# Patient Record
Sex: FEMALE | Race: WHITE | NOT HISPANIC OR LATINO | Employment: FULL TIME | URBAN - METROPOLITAN AREA
[De-identification: names, ages, dates, MRNs, and addresses within clinical notes are randomized per-mention and may not be internally consistent; named-entity substitution may affect disease eponyms.]

---

## 2017-12-24 ENCOUNTER — HOSPITAL ENCOUNTER (EMERGENCY)
Facility: MEDICAL CENTER | Age: 42
End: 2017-12-24
Attending: EMERGENCY MEDICINE
Payer: COMMERCIAL

## 2017-12-24 VITALS
TEMPERATURE: 97.8 F | HEART RATE: 66 BPM | DIASTOLIC BLOOD PRESSURE: 85 MMHG | SYSTOLIC BLOOD PRESSURE: 132 MMHG | RESPIRATION RATE: 20 BRPM | BODY MASS INDEX: 25.59 KG/M2 | WEIGHT: 149.91 LBS | OXYGEN SATURATION: 99 % | HEIGHT: 64 IN

## 2017-12-24 DIAGNOSIS — J02.9 PHARYNGITIS, UNSPECIFIED ETIOLOGY: ICD-10-CM

## 2017-12-24 LAB
S PYO AG THROAT QL: NORMAL
SIGNIFICANT IND 70042: NORMAL
SITE SITE: NORMAL
SOURCE SOURCE: NORMAL

## 2017-12-24 PROCEDURE — 87880 STREP A ASSAY W/OPTIC: CPT

## 2017-12-24 PROCEDURE — 87081 CULTURE SCREEN ONLY: CPT

## 2017-12-24 PROCEDURE — 99283 EMERGENCY DEPT VISIT LOW MDM: CPT

## 2017-12-24 RX ORDER — PENICILLIN V POTASSIUM 500 MG/1
500 TABLET ORAL EVERY 6 HOURS
Qty: 40 TAB | Refills: 0 | Status: SHIPPED | OUTPATIENT
Start: 2017-12-24 | End: 2018-01-03

## 2017-12-24 ASSESSMENT — PAIN SCALES - GENERAL: PAINLEVEL_OUTOF10: 3

## 2017-12-24 NOTE — DISCHARGE INSTRUCTIONS
Pharyngitis  Pharyngitis is a sore throat (pharynx). There is redness, pain, and swelling of your throat.  HOME CARE   · Drink enough fluids to keep your pee (urine) clear or pale yellow.  · Only take medicine as told by your doctor.  ¨ You may get sick again if you do not take medicine as told. Finish your medicines, even if you start to feel better.  ¨ Do not take aspirin.  · Rest.  · Rinse your mouth (gargle) with salt water (½ tsp of salt per 1 qt of water) every 1-2 hours. This will help the pain.  · If you are not at risk for choking, you can suck on hard candy or sore throat lozenges.  GET HELP IF:  · You have large, tender lumps on your neck.  · You have a rash.  · You cough up green, yellow-brown, or bloody spit.  GET HELP RIGHT AWAY IF:   · You have a stiff neck.  · You drool or cannot swallow liquids.  · You throw up (vomit) or are not able to keep medicine or liquids down.  · You have very bad pain that does not go away with medicine.  · You have problems breathing (not from a stuffy nose).  MAKE SURE YOU:   · Understand these instructions.  · Will watch your condition.  · Will get help right away if you are not doing well or get worse.     This information is not intended to replace advice given to you by your health care provider. Make sure you discuss any questions you have with your health care provider.     Document Released: 06/05/2009 Document Revised: 10/08/2014 Document Reviewed: 08/25/2014  [employer] Interactive Patient Education ©2016 [employer]    Salt Water Gargle  This solution will help make your mouth and throat feel better.  HOME CARE INSTRUCTIONS   · Mix 1 teaspoon of salt in 8 ounces of warm water.  · Gargle with this solution as much or often as you need or as directed. Swish and gargle gently if you have any sores or wounds in your mouth.  · Do not swallow this mixture.     This information is not intended to replace advice given to you by your health care provider. Make sure you  discuss any questions you have with your health care provider.     Document Released: 09/21/2005 Document Revised: 03/11/2013 Document Reviewed: 02/12/2010  [employer]vier Interactive Patient Education ©2016 [employer]

## 2017-12-24 NOTE — ED PROVIDER NOTES
"ED Provider Note    CHIEF COMPLAINT  Chief Complaint   Patient presents with   • Sore Throat       HPI  Aixa Yuen is a 42 y.o. female who presents For evaluation of a sore throat.  The patient presents with one day progressive sore throat mostly on the right side.  The patient denies: Nasal congestion, clear rhinorrhea, cough, sputum, gastrointestinal symptoms, rashes.  No other acute symptomatology or complaints.    REVIEW OF SYSTEMS  See HPI for further details.  She denies any history of: Hypertension, diabetes, thyroid dysfunction, seizures, heart disease, cancer, stroke;    PAST MEDICAL HISTORY  Past Medical History:   Diagnosis Date   • Alcohol use    • Chronic back pain    • Left knee pain        FAMILY HISTORY  Family History   Problem Relation Age of Onset   • Stroke Father    • Alcohol/Drug Father      alcoholic   • Stroke Paternal Grandmother    • Alcohol/Drug Mother      alcoholic   • Hypertension Mother    • Cancer Paternal Aunt      melanoma   • Cancer Paternal Aunt      lung ca, smoker   • Thyroid Paternal Aunt    • Allergies Neg Hx    • Heart Disease Neg Hx    • Diabetes Neg Hx    • Hyperlipidemia Neg Hx    • Psychiatry Neg Hx        SOCIAL HISTORY  Nonsmoker; denies alcohol or drugs;    SURGICAL HISTORY  Past Surgical History:   Procedure Laterality Date   • APPENDECTOMY LAPAROSCOPIC  5/18/2015    Procedure: APPENDECTOMY LAPAROSCOPIC;  Surgeon: Yared Braswell M.D.;  Location: SURGERY [facility];  Service:    • ARTHROSCOPY, KNEE  2013       CURRENT MEDICATIONS  See nurses notes    ALLERGIES  Allergies   Allergen Reactions   • Aleve [Naproxen Sodium] Diarrhea   • Lactose Nausea   • Percocet [Oxycodone-Acetaminophen]    • Sudafed [Pseudoephedrine Hcl]      Bad headache/ wired         PHYSICAL EXAM  VITAL SIGNS: /85   Pulse 66   Temp 36.6 °C (97.8 °F)   Resp 20   Ht 1.626 m (5' 4\") Comment: Stated  Wt 68 kg (149 lb 14.6 oz)   [facility] 12/24/2017   SpO2 99%   BMI 25.73 kg/m²  "   Constitutional: Well developed, Well nourished, No acute distress, Non-toxic appearance.   HENT: Normocephalic, Atraumatic, Nares:Clear, Oropharynx: moist, well hydrated, posterior pharynx:Bilateral erythema to the posterior pharynx with some increased erythema on the right compared to left and no significant exudate and no unilateral throat swelling or deviation of the uvula; phonation is normal;  Eyes: PERRL, EOMI, Conjunctiva normal, No discharge.   Neck: Normal range of motion, No tenderness, Supple, No stridor.   Lymphatic: Tender submandibular lymphadenopathy noted.   Cardiovascular: Regular rate and rhythm without mumurs, gallups, rubs   Thorax & Lungs: Normal Equal breath sounds, No respiratory distress, No wheezing, no stridor, no rales. No chest tenderness.   Abdomen: Soft, nontender, nondistended, no organomegaly, positive bowel sounds normal in quality. No guarding or rebound.  Skin: Good skin turgor, pink, warm, dry. No rashes, petechiae, purpura. Normal capillary refill.   Extremities: Intact distal pulses, No edema, No tenderness, No cyanosis,  Vascular: Pulses are 2+, symmetric in the upper and lower extremities.  Neurologic: Alert & oriented x 3,  No gross focal deficits noted.     COURSE & MEDICAL DECISION MAKING  Pertinent Labs & Imaging studies reviewed. (See chart for details)  Rapid strep is negative;    Discussion: At this time, the patient presents for evaluation of a sore throat.  The patient's rapid strep is negative.  Patient has some clinical findings that I would recommend initiating antibiotics pending culture.  I discussed the findings and treatment plan with the patient.  She indicates that she is comfortable with this exploration and disposition.    FINAL IMPRESSION  1. Pharyngitis, unspecified etiology        PLAN  1.  Appropriate discharge instructions given  2.  Pen-Vee K 500 mg for the  3.  Follow up with primary care  4.  Recheck of change worsening symptoms or any  disconcerting symptoms she is not experiencing at this time.    Electronically signed by: Guy G Gansert, 12/24/2017 11:55 AM

## 2017-12-26 LAB
S PYO SPEC QL CULT: NORMAL
SIGNIFICANT IND 70042: NORMAL
SITE SITE: NORMAL
SOURCE SOURCE: NORMAL

## 2018-01-01 ENCOUNTER — OFFICE VISIT (OUTPATIENT)
Dept: URGENT CARE | Facility: CLINIC | Age: 43
End: 2018-01-01

## 2018-01-01 VITALS
BODY MASS INDEX: 25.61 KG/M2 | DIASTOLIC BLOOD PRESSURE: 78 MMHG | OXYGEN SATURATION: 98 % | HEIGHT: 64 IN | SYSTOLIC BLOOD PRESSURE: 120 MMHG | HEART RATE: 72 BPM | WEIGHT: 150 LBS | RESPIRATION RATE: 20 BRPM | TEMPERATURE: 98.1 F

## 2018-01-01 DIAGNOSIS — J01.00 ACUTE MAXILLARY SINUSITIS, RECURRENCE NOT SPECIFIED: ICD-10-CM

## 2018-01-01 PROCEDURE — 99214 OFFICE O/P EST MOD 30 MIN: CPT | Performed by: NURSE PRACTITIONER

## 2018-01-01 RX ORDER — AMOXICILLIN AND CLAVULANATE POTASSIUM 875; 125 MG/1; MG/1
1 TABLET, FILM COATED ORAL 2 TIMES DAILY
Qty: 14 TAB | Refills: 0 | Status: SHIPPED | OUTPATIENT
Start: 2018-01-01 | End: 2018-01-08

## 2018-01-01 ASSESSMENT — ENCOUNTER SYMPTOMS
CARDIOVASCULAR NEGATIVE: 1
CHILLS: 0
SORE THROAT: 1
EYES NEGATIVE: 1
COUGH: 1
SINUS PAIN: 1
FEVER: 0
SINUS PRESSURE: 1
MYALGIAS: 1

## 2018-01-01 NOTE — PROGRESS NOTES
Subjective:      Aixa Yuen is a 42 y.o. female who presents with Sinus Problem (X 5 days sinus pressure, stuffy nose  , yesterday headache .)    Past Medical History:   Diagnosis Date   • Alcohol use    • Chronic back pain    • Left knee pain      Social History     Social History   • Marital status: Single     Spouse name: N/A   • Number of children: N/A   • Years of education: N/A     Occupational History   • Not on file.     Social History Main Topics   • Smoking status: Never Smoker   • Smokeless tobacco: Never Used   • Alcohol use No   • Drug use: No   • Sexual activity: Not on file     Other Topics Concern   • Not on file     Social History Narrative    Lives by herself, single.     Work: [employer].      Family History   Problem Relation Age of Onset   • Stroke Father    • Alcohol/Drug Father      alcoholic   • Stroke Paternal Grandmother    • Alcohol/Drug Mother      alcoholic   • Hypertension Mother    • Cancer Paternal Aunt      melanoma   • Cancer Paternal Aunt      lung ca, smoker   • Thyroid Paternal Aunt    • Allergies Neg Hx    • Heart Disease Neg Hx    • Diabetes Neg Hx    • Hyperlipidemia Neg Hx    • Psychiatry Neg Hx        Allergies: Aleve [naproxen sodium]; Lactose; Percocet [oxycodone-acetaminophen]; and Sudafed [pseudoephedrine hcl]          Sinus Problem   This is a new problem. The current episode started in the past 7 days. The problem has been gradually worsening since onset. There has been no fever. Associated symptoms include congestion, coughing, sinus pressure and a sore throat. Pertinent negatives include no chills. Past treatments include nothing. The treatment provided no relief.       Review of Systems   Constitutional: Positive for malaise/fatigue. Negative for chills and fever.   HENT: Positive for congestion, sinus pain, sinus pressure and sore throat.    Eyes: Negative.    Respiratory: Positive for cough.    Cardiovascular: Negative.    Musculoskeletal: Positive for  "myalgias.   Skin: Negative.    All other systems reviewed and are negative.         Objective:     /78   Pulse 72   Temp 36.7 °C (98.1 °F)   Resp 20   Ht 1.626 m (5' 4\")   Wt 68 kg (150 lb)   LMP 12/24/2017   SpO2 98%   BMI 25.75 kg/m²      Physical Exam   Constitutional: She is oriented to person, place, and time. She appears well-developed and well-nourished.   HENT:   Head: Normocephalic.   Right Ear: External ear normal.   Left Ear: External ear normal.   Nose: Nose normal.   Mouth/Throat: Oropharynx is clear and moist. No oropharyngeal exudate.   Eyes: Pupils are equal, round, and reactive to light.   Neck: Normal range of motion. Neck supple.   Cardiovascular: Normal rate.    Pulmonary/Chest: Effort normal and breath sounds normal.   Musculoskeletal: Normal range of motion.   Neurological: She is alert and oriented to person, place, and time.   Skin: Skin is warm and dry. Capillary refill takes less than 2 seconds.   Psychiatric: She has a normal mood and affect. Her behavior is normal. Judgment and thought content normal.   Vitals reviewed.              Assessment/Plan:     1. Acute maxillary sinusitis, recurrence not specified  -Augmentin  -flonase  -humidifier  -saltwater gargles PRN  -follow up with PCP or ENT for persistent or worsening of symptoms.       "

## 2018-07-16 ENCOUNTER — HOSPITAL ENCOUNTER (OUTPATIENT)
Dept: RADIOLOGY | Facility: MEDICAL CENTER | Age: 43
End: 2018-07-16

## 2018-07-16 ENCOUNTER — APPOINTMENT (OUTPATIENT)
Dept: RADIOLOGY | Facility: MEDICAL CENTER | Age: 43
End: 2018-07-16
Attending: OBSTETRICS & GYNECOLOGY
Payer: COMMERCIAL

## 2018-07-19 ENCOUNTER — HOSPITAL ENCOUNTER (OUTPATIENT)
Dept: RADIOLOGY | Facility: MEDICAL CENTER | Age: 43
End: 2018-07-19
Attending: OBSTETRICS & GYNECOLOGY
Payer: COMMERCIAL

## 2018-07-19 DIAGNOSIS — Z86.018 STATUS POST EXCISION OF FIBROADENOMA OF BREAST: ICD-10-CM

## 2018-07-19 DIAGNOSIS — Z86.018 HISTORY OF BENIGN NEOPLASM: ICD-10-CM

## 2018-07-19 DIAGNOSIS — Z98.890 STATUS POST EXCISION OF FIBROADENOMA OF BREAST: ICD-10-CM

## 2018-07-19 PROCEDURE — G0279 TOMOSYNTHESIS, MAMMO: HCPCS

## 2018-07-19 PROCEDURE — 76642 ULTRASOUND BREAST LIMITED: CPT | Mod: LT

## 2021-10-04 ENCOUNTER — APPOINTMENT (OUTPATIENT)
Dept: RADIOLOGY | Facility: MEDICAL CENTER | Age: 46
End: 2021-10-04
Attending: EMERGENCY MEDICINE
Payer: COMMERCIAL

## 2021-10-04 ENCOUNTER — HOSPITAL ENCOUNTER (EMERGENCY)
Facility: MEDICAL CENTER | Age: 46
End: 2021-10-04
Attending: EMERGENCY MEDICINE
Payer: COMMERCIAL

## 2021-10-04 VITALS
TEMPERATURE: 97.4 F | HEART RATE: 46 BPM | DIASTOLIC BLOOD PRESSURE: 82 MMHG | HEIGHT: 64 IN | WEIGHT: 139.77 LBS | SYSTOLIC BLOOD PRESSURE: 158 MMHG | OXYGEN SATURATION: 98 % | BODY MASS INDEX: 23.86 KG/M2 | RESPIRATION RATE: 14 BRPM

## 2021-10-04 DIAGNOSIS — M54.9 UPPER BACK PAIN ON RIGHT SIDE: ICD-10-CM

## 2021-10-04 LAB
ALBUMIN SERPL BCP-MCNC: 4.4 G/DL (ref 3.2–4.9)
ALBUMIN/GLOB SERPL: 1.8 G/DL
ALP SERPL-CCNC: 36 U/L (ref 30–99)
ALT SERPL-CCNC: 13 U/L (ref 2–50)
ANION GAP SERPL CALC-SCNC: 12 MMOL/L (ref 7–16)
APPEARANCE UR: ABNORMAL
AST SERPL-CCNC: 15 U/L (ref 12–45)
BACTERIA #/AREA URNS HPF: ABNORMAL /HPF
BASOPHILS # BLD AUTO: 1.2 % (ref 0–1.8)
BASOPHILS # BLD: 0.07 K/UL (ref 0–0.12)
BILIRUB SERPL-MCNC: 0.5 MG/DL (ref 0.1–1.5)
BILIRUB UR QL STRIP.AUTO: NEGATIVE
BUN SERPL-MCNC: 13 MG/DL (ref 8–22)
CALCIUM SERPL-MCNC: 8.9 MG/DL (ref 8.4–10.2)
CHLORIDE SERPL-SCNC: 107 MMOL/L (ref 96–112)
CO2 SERPL-SCNC: 23 MMOL/L (ref 20–33)
COLOR UR: YELLOW
CREAT SERPL-MCNC: 0.73 MG/DL (ref 0.5–1.4)
EKG IMPRESSION: NORMAL
EOSINOPHIL # BLD AUTO: 0.16 K/UL (ref 0–0.51)
EOSINOPHIL NFR BLD: 2.7 % (ref 0–6.9)
EPI CELLS #/AREA URNS HPF: ABNORMAL /HPF
ERYTHROCYTE [DISTWIDTH] IN BLOOD BY AUTOMATED COUNT: 44.4 FL (ref 35.9–50)
GLOBULIN SER CALC-MCNC: 2.5 G/DL (ref 1.9–3.5)
GLUCOSE SERPL-MCNC: 95 MG/DL (ref 65–99)
GLUCOSE UR STRIP.AUTO-MCNC: NEGATIVE MG/DL
HCT VFR BLD AUTO: 40.4 % (ref 37–47)
HGB BLD-MCNC: 13.5 G/DL (ref 12–16)
IMM GRANULOCYTES # BLD AUTO: 0.01 K/UL (ref 0–0.11)
IMM GRANULOCYTES NFR BLD AUTO: 0.2 % (ref 0–0.9)
KETONES UR STRIP.AUTO-MCNC: 15 MG/DL
LEUKOCYTE ESTERASE UR QL STRIP.AUTO: NEGATIVE
LIPASE SERPL-CCNC: 18 U/L (ref 7–58)
LYMPHOCYTES # BLD AUTO: 1.84 K/UL (ref 1–4.8)
LYMPHOCYTES NFR BLD: 30.7 % (ref 22–41)
MCH RBC QN AUTO: 31 PG (ref 27–33)
MCHC RBC AUTO-ENTMCNC: 33.4 G/DL (ref 33.6–35)
MCV RBC AUTO: 92.7 FL (ref 81.4–97.8)
MICRO URNS: ABNORMAL
MONOCYTES # BLD AUTO: 0.62 K/UL (ref 0–0.85)
MONOCYTES NFR BLD AUTO: 10.3 % (ref 0–13.4)
MUCOUS THREADS #/AREA URNS HPF: ABNORMAL /HPF
NEUTROPHILS # BLD AUTO: 3.3 K/UL (ref 2–7.15)
NEUTROPHILS NFR BLD: 54.9 % (ref 44–72)
NITRITE UR QL STRIP.AUTO: NEGATIVE
NRBC # BLD AUTO: 0 K/UL
NRBC BLD-RTO: 0 /100 WBC
PH UR STRIP.AUTO: 5.5 [PH] (ref 5–8)
PLATELET # BLD AUTO: 219 K/UL (ref 164–446)
PMV BLD AUTO: 10.5 FL (ref 9–12.9)
POTASSIUM SERPL-SCNC: 3.5 MMOL/L (ref 3.6–5.5)
PROT SERPL-MCNC: 6.9 G/DL (ref 6–8.2)
PROT UR QL STRIP: NEGATIVE MG/DL
RBC # BLD AUTO: 4.36 M/UL (ref 4.2–5.4)
RBC # URNS HPF: ABNORMAL /HPF
RBC UR QL AUTO: ABNORMAL
SODIUM SERPL-SCNC: 142 MMOL/L (ref 135–145)
SP GR UR STRIP.AUTO: 1.02
TROPONIN T SERPL-MCNC: <6 NG/L (ref 6–19)
WBC # BLD AUTO: 6 K/UL (ref 4.8–10.8)
WBC #/AREA URNS HPF: ABNORMAL /HPF

## 2021-10-04 PROCEDURE — 99283 EMERGENCY DEPT VISIT LOW MDM: CPT

## 2021-10-04 PROCEDURE — 700102 HCHG RX REV CODE 250 W/ 637 OVERRIDE(OP): Performed by: EMERGENCY MEDICINE

## 2021-10-04 PROCEDURE — 81001 URINALYSIS AUTO W/SCOPE: CPT

## 2021-10-04 PROCEDURE — 80053 COMPREHEN METABOLIC PANEL: CPT

## 2021-10-04 PROCEDURE — 85025 COMPLETE CBC W/AUTO DIFF WBC: CPT

## 2021-10-04 PROCEDURE — 71045 X-RAY EXAM CHEST 1 VIEW: CPT

## 2021-10-04 PROCEDURE — 93005 ELECTROCARDIOGRAM TRACING: CPT | Performed by: EMERGENCY MEDICINE

## 2021-10-04 PROCEDURE — A9270 NON-COVERED ITEM OR SERVICE: HCPCS | Performed by: EMERGENCY MEDICINE

## 2021-10-04 PROCEDURE — 83690 ASSAY OF LIPASE: CPT

## 2021-10-04 PROCEDURE — 84484 ASSAY OF TROPONIN QUANT: CPT

## 2021-10-04 RX ORDER — CYCLOBENZAPRINE HCL 10 MG
10 TABLET ORAL 3 TIMES DAILY PRN
Qty: 15 TABLET | Refills: 0 | Status: SHIPPED | OUTPATIENT
Start: 2021-10-04 | End: 2022-06-29

## 2021-10-04 RX ORDER — CYCLOBENZAPRINE HCL 10 MG
10 TABLET ORAL ONCE
Status: COMPLETED | OUTPATIENT
Start: 2021-10-04 | End: 2021-10-04

## 2021-10-04 RX ADMIN — CYCLOBENZAPRINE 10 MG: 10 TABLET, FILM COATED ORAL at 06:18

## 2021-10-04 NOTE — ED PROVIDER NOTES
ED Provider Note    CHIEF COMPLAINT  Chief Complaint   Patient presents with   • Flank Pain     right side pain that starts in her back and feels like it goes straight through the fron of her abd    • Nausea       HPI  Aixa Yuen is a 45 y.o. female who presents with right-sided chest/right upper quadrant pain that radiates into her back in the area of her right scapula.  She notes that the pain has been present for the past 2 days.  She was seen at a hospital in [place] yesterday for this.  We do have access to their medical records.  She states that she was visiting her sister at that time.  Nausea without vomiting.  Denies prior history of similar symptoms in the past.  She states that after eating the pain got slightly worse.  No fevers.  No cough or recent illness.  Has a prior history of appendectomy though no other abdominal surgeries.    No lower extremity swelling or pain.  No prior history of DVT or PE.  No known cardiovascular disease history.  No dysuria or hematuria.  No vaginal bleeding or discharge.    I did review the medical records from the outside hospital from yesterday.  She was evaluated for right upper quadrant and right upper back pain at that time.  She had an unremarkable abdominal ultrasound.  Laboratory studies were unremarkable as well.    REVIEW OF SYSTEMS  See HPI for further details. All other systems are negative.     PAST MEDICAL HISTORY   has a past medical history of Alcohol use, Chronic back pain, and Left knee pain.    SOCIAL HISTORY  Social History     Tobacco Use   • Smoking status: Never Smoker   • Smokeless tobacco: Never Used   Vaping Use   • Vaping Use: Never used   Substance and Sexual Activity   • Alcohol use: Yes     Alcohol/week: 6.0 oz     Types: 10 Glasses of wine per week     Comment: 2 drinks a day, fove days a weel    • Drug use: No   • Sexual activity: Not on file       SURGICAL HISTORY   has a past surgical history that includes arthroscopy, knee (2013)  "and appendectomy laparoscopic (2015).    CURRENT MEDICATIONS  Home Medications    **Home medications have not yet been reviewed for this encounter**         ALLERGIES  Allergies   Allergen Reactions   • Aleve [Naproxen Sodium] Diarrhea   • Lactose Nausea   • Percocet [Oxycodone-Acetaminophen] Itching       PHYSICAL EXAM  VITAL SIGNS: Pulse (!) 54   Temp 36.8 °C (98.2 °F) (Temporal)   Resp 18   Ht 1.626 m (5' 4\")   Wt 63.4 kg (139 lb 12.4 oz)   SpO2 98%   BMI 23.99 kg/m²   Pulse ox interpretation: I interpret this pulse ox as normal.  Constitutional: Alert in no apparent distress.  HENT: No signs of trauma, Bilateral external ears normal, Nose normal.   Eyes: Conjunctiva normal, Non-icteric.   Neck: Normal range of motion, Supple, No stridor.   Cardiovascular: Regular rate and rhythm.   Thorax & Lungs: Normal breath sounds, No respiratory distress, No wheezing, right lower chest tenderness.   Abdomen: Soft, No tenderness, No masses, No pulsatile masses. No peritoneal signs.  Skin: Warm, Dry, No erythema, No rash.   Back: No bony tenderness, No CVA tenderness.  Point tenderness just medial to the right scapula in the paraspinous region.  Extremities: Intact distal pulses, No edema, No cyanosis  Musculoskeletal: Good range of motion in all major joints. No major deformities noted.   Neurologic: Alert, No focal deficits noted.       DIAGNOSTIC STUDIES / PROCEDURES    EKG - Physician interpretation  Results for orders placed or performed during the hospital encounter of 10/04/21   EKG   Result Value Ref Range    Report       [facility] Emergency Dept.    Test Date:  2021-10-04  Pt Name:    SOPHIE HWANG               Department: [facility]  MRN:        [id number]                      Room:       -ROOM 1  Gender:     Female                       Technician: [id number]  :        1975                   Requested By:AKASH CRUZ  Order #:    [id number]                    Reading MD: AKASH " MD ANTHONY    Measurements  Intervals                                Axis  Rate:       45                           P:          35  OK:         139                          QRS:        80  QRSD:       99                           T:          50  QT:         461  QTc:        399    Interpretive Statements  Sinus bradycardia  Low voltage, precordial leads  Compared to ECG 05/18/2015 19:22:35  Electronically Signed On 10-4-2021 4:10:23 PDT by AKASH CRUZ MD           LABS  Labs Reviewed   URINALYSIS - Abnormal; Notable for the following components:       Result Value    Character Hazy (*)     Ketones 15 (*)     Occult Blood Small (*)     All other components within normal limits   CBC WITH DIFFERENTIAL - Abnormal; Notable for the following components:    MCHC 33.4 (*)     All other components within normal limits   COMP METABOLIC PANEL - Abnormal; Notable for the following components:    Potassium 3.5 (*)     All other components within normal limits   URINE MICROSCOPIC (W/UA) - Abnormal; Notable for the following components:    RBC 2-5 (*)     Bacteria Few (*)     All other components within normal limits   LIPASE   TROPONIN   ESTIMATED GFR         RADIOLOGY  DX-CHEST-PORTABLE (1 VIEW)   Final Result         1.  No acute cardiopulmonary disease.            COURSE & MEDICAL DECISION MAKING    Medications   cyclobenzaprine (Flexeril) tablet 10 mg (10 mg Oral Given 10/4/21 0618)       Pertinent Labs & Imaging studies reviewed. (See chart for details)  45 y.o. female presenting with right upper back pain.  She was seen yesterday at another emergency department for similar symptoms and is seeking care for continued pain.  It does appear that the pain is more in the thoracic region rather than the abdomen.  She does appear to have point tenderness just medial to the scapula in the area of the rhomboid muscles.  Normal range of motion of bilateral upper extremities.  No abdominal tenderness.  No dysuria or hematuria.    Given  "the location of the pain in the chest, x-ray was performed and was unremarkable.  EKG and troponin are unremarkable as well.  Heart score of 1 given age.  Pulmonary embolism rule out criteria negative.    She does have point tenderness to the back on physical examination.  She does note on Thursday, prior to the onset of symptoms, she had been moving some large/heavy items.    The patient is hemodynamically stable.  No tachycardia.  Clear breath sounds bilaterally.  No respiratory distress.  Given the reproducible nature of the patient's pain, I do suspect more of a musculoskeletal cause rather than a cardiac or pulmonary cause.  Low suspicion for an aortic injury resulting in right-sided chest and back discomfort.  Equal pulses bilaterally.  No focal neurologic deficit.    Recommending pain management with NSAIDs and to use Flexeril as needed for breakthrough pain symptoms.  Recommending follow-up with primary care physician for further management.    The patient was instructed to follow-up with primary care physician for further management.  To return immediately for any worsening symptoms or development of any other concerning signs or symptoms. The patient verbalizes understanding in their own words.    /82   Pulse (!) 46   Temp 36.3 °C (97.4 °F) (Temporal)   Resp 14   Ht 1.626 m (5' 4\")   Wt 63.4 kg (139 lb 12.4 oz)   LMP 09/27/2021   SpO2 98%   BMI 23.99 kg/m²     The patient was referred to primary care where they will receive further BP management.      [facility], Emergency Dept  [address]  [place] [address]  [phone]    As needed, If symptoms worsen    Primary care doctor    Schedule an appointment as soon as possible for a visit         FINAL IMPRESSION  1. Upper back pain on right side            Electronically signed by: Waqar Flor M.D., 10/4/2021 3:42 AM    "

## 2021-10-04 NOTE — ED NOTES
Patient discharged home in stable condition with   AVS provided with recommended follow up and home care instructions and education information  Prescription for Flexeril electronically provided at this time  Patient and  verbalized understanding  Ambulatory at time of discharge

## 2021-10-04 NOTE — ED TRIAGE NOTES
"Chief Complaint   Patient presents with   • Flank Pain     right side pain that starts in her back and feels like it goes straight through the fron of her abd    • Nausea     Pulse (!) 54   Temp 36.8 °C (98.2 °F) (Temporal)   Resp 18   Ht 1.626 m (5' 4\")   Wt 63.4 kg (139 lb 12.4 oz)   LMP 09/27/2021   SpO2 98%   BMI 23.99 kg/m²     Has this patient been vaccinated for COVID no  If not, would they like to be vaccinated while in the ER if eligible?  no  Would the patient like to speak with the ERP about the possibility of receiving the COVID vaccine today before making a decision? no    "

## 2022-03-07 ENCOUNTER — HOSPITAL ENCOUNTER (OUTPATIENT)
Facility: MEDICAL CENTER | Age: 47
End: 2022-03-07
Attending: NURSE PRACTITIONER
Payer: COMMERCIAL

## 2022-03-07 ENCOUNTER — EH NON-PROVIDER (OUTPATIENT)
Dept: OCCUPATIONAL MEDICINE | Facility: CLINIC | Age: 47
End: 2022-03-07

## 2022-03-07 DIAGNOSIS — Z02.1 PRE-EMPLOYMENT HEALTH SCREENING EXAMINATION: ICD-10-CM

## 2022-03-07 DIAGNOSIS — Z02.1 PRE-EMPLOYMENT HEALTH SCREENING EXAMINATION: Primary | ICD-10-CM

## 2022-03-07 PROCEDURE — 94375 RESPIRATORY FLOW VOLUME LOOP: CPT | Performed by: NURSE PRACTITIONER

## 2022-03-07 PROCEDURE — 86480 TB TEST CELL IMMUN MEASURE: CPT | Performed by: NURSE PRACTITIONER

## 2022-03-10 LAB
GAMMA INTERFERON BACKGROUND BLD IA-ACNC: 0.03 IU/ML
M TB IFN-G BLD-IMP: NEGATIVE
M TB IFN-G CD4+ BCKGRND COR BLD-ACNC: 0 IU/ML
MITOGEN IGNF BCKGRD COR BLD-ACNC: >10 IU/ML
QFT TB2 - NIL TBQ2: -0.01 IU/ML

## 2022-03-14 ENCOUNTER — EMPLOYEE HEALTH (OUTPATIENT)
Dept: OCCUPATIONAL MEDICINE | Facility: CLINIC | Age: 47
End: 2022-03-14

## 2022-03-14 DIAGNOSIS — Z02.1 PRE-EMPLOYMENT HEALTH SCREENING EXAMINATION: ICD-10-CM

## 2022-03-14 PROCEDURE — 8915 PR COMPREHENSIVE PHYSICAL: Performed by: NURSE PRACTITIONER

## 2022-03-21 ENCOUNTER — RESEARCH ENCOUNTER (OUTPATIENT)
Dept: RESEARCH | Facility: WORKSITE | Age: 47
End: 2022-03-21
Payer: COMMERCIAL

## 2022-03-21 DIAGNOSIS — Z00.6 RESEARCH STUDY PATIENT: ICD-10-CM

## 2022-03-21 NOTE — RESEARCH NOTE
[facility] [place] [facility] enrollment complete. Saliva sample collected onsite.[facility] Project enrollment complete. Saliva sample collected onsite.

## 2022-05-02 LAB
APOB+LDLR+PCSK9 GENE MUT ANL BLD/T: NOT DETECTED
BRCA1+BRCA2 DEL+DUP + FULL MUT ANL BLD/T: NOT DETECTED
MLH1+MSH2+MSH6+PMS2 GN DEL+DUP+FUL M: NOT DETECTED

## 2022-05-07 ENCOUNTER — HOSPITAL ENCOUNTER (OUTPATIENT)
Dept: LAB | Facility: MEDICAL CENTER | Age: 47
End: 2022-05-07
Attending: INTERNAL MEDICINE
Payer: COMMERCIAL

## 2022-05-07 LAB
T4 FREE SERPL-MCNC: 1.61 NG/DL (ref 0.93–1.7)
TSH SERPL DL<=0.005 MIU/L-ACNC: 1.78 UIU/ML (ref 0.38–5.33)

## 2022-05-07 PROCEDURE — 84443 ASSAY THYROID STIM HORMONE: CPT

## 2022-05-07 PROCEDURE — 84439 ASSAY OF FREE THYROXINE: CPT

## 2022-05-07 PROCEDURE — 36415 COLL VENOUS BLD VENIPUNCTURE: CPT

## 2022-06-17 PROCEDURE — RXMED WILLOW AMBULATORY MEDICATION CHARGE: Performed by: INTERNAL MEDICINE

## 2022-06-23 ENCOUNTER — PHARMACY VISIT (OUTPATIENT)
Dept: PHARMACY | Facility: MEDICAL CENTER | Age: 47
End: 2022-06-23
Payer: COMMERCIAL

## 2022-06-28 ENCOUNTER — TELEPHONE (OUTPATIENT)
Dept: SCHEDULING | Facility: IMAGING CENTER | Age: 47
End: 2022-06-28

## 2022-06-29 ENCOUNTER — OFFICE VISIT (OUTPATIENT)
Dept: MEDICAL GROUP | Facility: LAB | Age: 47
End: 2022-06-29
Payer: COMMERCIAL

## 2022-06-29 ENCOUNTER — PHARMACY VISIT (OUTPATIENT)
Dept: PHARMACY | Facility: MEDICAL CENTER | Age: 47
End: 2022-06-29
Payer: COMMERCIAL

## 2022-06-29 VITALS
RESPIRATION RATE: 16 BRPM | OXYGEN SATURATION: 97 % | WEIGHT: 131 LBS | DIASTOLIC BLOOD PRESSURE: 62 MMHG | TEMPERATURE: 97.3 F | HEART RATE: 60 BPM | SYSTOLIC BLOOD PRESSURE: 118 MMHG | HEIGHT: 64 IN | BODY MASS INDEX: 22.36 KG/M2

## 2022-06-29 DIAGNOSIS — Z76.89 ENCOUNTER TO ESTABLISH CARE: ICD-10-CM

## 2022-06-29 DIAGNOSIS — K21.00 GASTROESOPHAGEAL REFLUX DISEASE WITH ESOPHAGITIS WITHOUT HEMORRHAGE: ICD-10-CM

## 2022-06-29 DIAGNOSIS — Z13.220 LIPID SCREENING: ICD-10-CM

## 2022-06-29 PROBLEM — M25.551 HIP PAIN, RIGHT: Status: ACTIVE | Noted: 2018-06-04

## 2022-06-29 PROBLEM — R51.9 HEADACHE: Status: ACTIVE | Noted: 2018-11-26

## 2022-06-29 PROBLEM — M25.562 KNEE PAIN, LEFT: Status: ACTIVE | Noted: 2018-06-04

## 2022-06-29 PROBLEM — K21.9 GASTROESOPHAGEAL REFLUX DISEASE: Status: ACTIVE | Noted: 2017-07-03

## 2022-06-29 PROBLEM — R10.11 ABDOMINAL PAIN, RIGHT UPPER QUADRANT: Status: ACTIVE | Noted: 2017-07-03

## 2022-06-29 PROBLEM — J45.909 ASTHMA WITHOUT STATUS ASTHMATICUS: Status: ACTIVE | Noted: 2022-06-29

## 2022-06-29 PROCEDURE — 99386 PREV VISIT NEW AGE 40-64: CPT | Performed by: PHYSICIAN ASSISTANT

## 2022-06-29 PROCEDURE — RXMED WILLOW AMBULATORY MEDICATION CHARGE: Performed by: PHYSICIAN ASSISTANT

## 2022-06-29 RX ORDER — ALBUTEROL SULFATE 90 UG/1
2 AEROSOL, METERED RESPIRATORY (INHALATION)
COMMUNITY

## 2022-06-29 RX ORDER — CIMETIDINE 400 MG/1
400 TABLET, FILM COATED ORAL 2 TIMES DAILY PRN
Qty: 180 TABLET | Refills: 0 | Status: SHIPPED | OUTPATIENT
Start: 2022-06-29 | End: 2022-10-28

## 2022-06-29 RX ORDER — OMEPRAZOLE 40 MG/1
CAPSULE, DELAYED RELEASE ORAL
COMMUNITY
Start: 2022-06-25 | End: 2023-04-25

## 2022-06-29 RX ORDER — LEVOTHYROXINE SODIUM 0.07 MG/1
75 TABLET ORAL DAILY
COMMUNITY
End: 2023-04-25

## 2022-06-29 RX ORDER — SUCRALFATE 1 G/1
1 TABLET ORAL
Qty: 120 TABLET | Refills: 3 | Status: SHIPPED | OUTPATIENT
Start: 2022-06-29 | End: 2023-04-25

## 2022-06-29 ASSESSMENT — FIBROSIS 4 INDEX: FIB4 SCORE: 0.91

## 2022-06-29 ASSESSMENT — PATIENT HEALTH QUESTIONNAIRE - PHQ9: CLINICAL INTERPRETATION OF PHQ2 SCORE: 0

## 2022-06-29 NOTE — LETTER
[facility]  No primary care provider on file.  No primary provider on file.  Fax: [phone]   Authorization for Release/Disclosure of   Protected Health Information   Name: AIXA YUEN : 1975 SSN: [id number]   Address: [place] [address] Phone:    [phone] (home) [phone] (work)   I authorize the entity listed below to release/disclose the PHI below to:   [facility]/No primary care provider on file. and Marcela Young P.A.-C.   Provider or Entity Name:     Address   City, State, [facility]   Phone:      Fax:     Reason for request: continuity of care   Information to be released:    [  ] LAST COLONOSCOPY,  including any PATH REPORT and follow-up  [  ] LAST FIT/COLOGUARD RESULT [  ] LAST DEXA  [  ] LAST MAMMOGRAM  [  ] LAST PAP  [  ] LAST LABS [  ] RETINA EXAM REPORT  [  ] IMMUNIZATION RECORDS  [  ] Release all info      [  ] Check here and initial the line next to each item to release ALL health information INCLUDING  _____ Care and treatment for drug and / or alcohol abuse  _____ HIV testing, infection status, or AIDS  _____ Genetic Testing    DATES OF SERVICE OR TIME PERIOD TO BE DISCLOSED: _____________  I understand and acknowledge that:  * This Authorization may be revoked at any time by you in writing, except if your health information has already been used or disclosed.  * Your health information that will be used or disclosed as a result of you signing this authorization could be re-disclosed by the recipient. If this occurs, your re-disclosed health information may no longer be protected by State or Federal laws.  * You may refuse to sign this Authorization. Your refusal will not affect your ability to obtain treatment.  * This Authorization becomes effective upon signing and will  on (date) __________.      If no date is indicated, this Authorization will  one (1) year from the signature date.    Name: Aixa Yuen    Signature:   Date:      6/29/2022       PLEASE FAX REQUESTED RECORDS BACK TO: [phone]

## 2022-06-29 NOTE — PROGRESS NOTES
Subjective:     CC:  Diagnoses of Gastroesophageal reflux disease with esophagitis without hemorrhage, Encounter to establish care, Lipid screening, and Screening for thyroid disorder were pertinent to this visit.    HISTORY OF THE PRESENT ILLNESS: Patient is a 46 y.o. female. This pleasant patient is here today to establish care and discuss abdominal pain. His/her prior PCP was Amelie Gracia at [facility].    Abdominal Pain  Pt reports that since November she has had a pressure sensation in the epigastric region that sometimes radiates upwards. Worst after eating. Worse with larger meals, and laying down after eating. Symptoms improve with burping and exercise. Reports bloating, spasm sensation in RUQ, burping  Denies N/V/D    Has cut back on caffeine, alcohol, chocolate with minimal improvement. She has also been working reducing stress.     Was previously evaluated by GI in May with endoscopy, showing gastritis and hiatal hernia. She has been taking omeprazole since March with some improvement in symptoms but no resolution. She has GI f/u in August.     Denies dark or bloody stools, unintentional weight loss, denies vomiting.          Health Maintenance     - Dyslipidemia (30-45): ordered today  - Diabetes (HTN, HLD, BMI >25): ordered today  - Depression screening (PHQ-2 and/or PHQ-9): neg  Diet: see above  Exercise: see above  Substance Use: denies  Tobacco Use/counseling: denies  Safe in relationship: yes  Seat belts, bike helmet, gun safety discussed.  Sun protection used.       Cancer screening  - Cervical CA (21-65): due  -  HX Abnormal pap/HPV: none  - Breast CA: mammo (required 50-75yo) or starting 40 (ACOG, ACR), 45 (ACS), 50 (USPTF): scheduled     Infectious disease screening/Immunizations  --Immunizations: UTD    Current Outpatient Medications Ordered in Epic   Medication Sig Dispense Refill   • sucralfate (CARAFATE) 1 GM Tab Take 1 Tablet by mouth 4 Times a Day,Before Meals and at Bedtime. 120  "Tablet 3   • cimetidine (TAGAMET) 400 MG Tab Take 1 Tablet by mouth 2 times a day as needed (acid reflux symtpoms) for up to 90 days. 180 Tablet 0   • Tiotropium Bromide Monohydrate 1.25 MCG/ACT Aero Soln Inhale 2 Puffs by mouth every day. 4 g 11   • SYNTHROID 75 MCG Tab 1 tab(s) Orally every day 90 days 90 Tablet 1   • Omega-3 Fatty Acids (FISH OIL PO) Take  by mouth.     • Calcium Carbonate-Vitamin D (CALCIUM + D PO) Take  by mouth. 1500      • omeprazole (PRILOSEC) 40 MG delayed-release capsule      • levothyroxine (SYNTHROID) 75 MCG Tab Take 75 mcg by mouth every day.     • Calcium Carbonate-Vitamin D 600-200 MG-UNIT Tab Take  by mouth.     • albuterol 108 (90 Base) MCG/ACT Aero Soln inhalation aerosol Inhale 2 Puffs.     • beclomethasone (QVAR) 80 MCG/ACT inhaler Inhale 1 Puff 2 times a day.       No current [facility]-ordered facility-administered medications on file.       Health Maintenance: Completed    ROS:   Gen: no fevers/chills, no changes in weight  Eyes: no changes in vision  ENT: no sore throat, no hearing loss, no bloody nose  Pulm: no sob, no cough  CV: no chest pain, no palpitations  GI: no nausea/vomiting, no diarrhea  : no dysuria  MSk: no myalgias  Skin: no rash  Neuro: no headaches, no numbness/tingling  Heme/Lymph: no easy bruising      Objective:       Exam: /62   Pulse 60   Temp 36.3 °C (97.3 °F)   Resp 16   Ht 1.626 m (5' 4\")   Wt 59.4 kg (131 lb)   SpO2 97%  Body mass index is 22.49 kg/m².    General: Normal appearing. No distress.  HEENT: Normocephalic. Eyes conjunctiva clear lids without ptosis, pupils equal and reactive to light accommodation, ears normal shape and contour, canals are clear bilaterally, tympanic membranes are benign, nasal mucosa benign, oropharynx is without erythema, edema or exudates.   Neck: Supple without JVD or bruit. Thyroid is not enlarged.  Pulmonary: Clear to ausculation.  Normal effort. No rales, ronchi, or wheezing.  Cardiovascular: Regular rate and " rhythm without murmur. Carotid and radial pulses are intact and equal bilaterally.  Abdomen: Soft, nontender, nondistended. Normal bowel sounds. Liver and spleen are not palpable  Neurologic: Grossly nonfocal  Lymph: No cervical or supraclavicular lymph nodes are palpable  Skin: Warm and dry.  No obvious lesions.  Musculoskeletal: Normal gait. No extremity cyanosis, clubbing, or edema.  Psych: Normal mood and affect. Alert and oriented x3. Judgment and insight is normal.      Assessment & Plan:   46 y.o. female with the following -    1. Gastroesophageal reflux disease with esophagitis without hemorrhage  Chronic condition  Continue omeprazole, trial of H2RA and sucralfate, continue dietary modifications, keep appt with GI  - sucralfate (CARAFATE) 1 GM Tab; Take 1 Tablet by mouth 4 Times a Day,Before Meals and at Bedtime.  Dispense: 120 Tablet; Refill: 3  - cimetidine (TAGAMET) 400 MG Tab; Take 1 Tablet by mouth 2 times a day as needed (acid reflux symtpoms) for up to 90 days.  Dispense: 180 Tablet; Refill: 0  - Referral to Health Coaching    2. Encounter to establish care  Labs per orders  Vaccinations per orders  Screenings per orders      3. Lipid screening  - CBC WITH DIFFERENTIAL; Future  - Comp Metabolic Panel; Future  - Lipid Profile; Future        I spent a total of 20 minutes with record review, exam, communication with the patient, communication with other providers, and documentation of this encounter.    Return in about 3 months (around 9/29/2022).    Please note that this dictation was created using voice recognition software. I have made every reasonable attempt to correct obvious errors, but I expect that there are errors of grammar and possibly content that I did not discover before finalizing the note.

## 2022-06-30 ENCOUNTER — HOSPITAL ENCOUNTER (OUTPATIENT)
Dept: RADIOLOGY | Facility: MEDICAL CENTER | Age: 47
End: 2022-06-30
Payer: COMMERCIAL

## 2022-06-30 DIAGNOSIS — R10.13 ABDOMINAL PAIN, EPIGASTRIC: ICD-10-CM

## 2022-06-30 DIAGNOSIS — R10.9 STOMACH ACHE: ICD-10-CM

## 2022-06-30 PROCEDURE — 74177 CT ABD & PELVIS W/CONTRAST: CPT

## 2022-06-30 PROCEDURE — 700117 HCHG RX CONTRAST REV CODE 255

## 2022-06-30 RX ADMIN — IOHEXOL 100 ML: 350 INJECTION, SOLUTION INTRAVENOUS at 16:15

## 2022-06-30 RX ADMIN — IOHEXOL 25 ML: 240 INJECTION, SOLUTION INTRATHECAL; INTRAVASCULAR; INTRAVENOUS; ORAL at 15:00

## 2022-07-06 ENCOUNTER — HOSPITAL ENCOUNTER (OUTPATIENT)
Dept: RADIOLOGY | Facility: MEDICAL CENTER | Age: 47
End: 2022-07-06
Payer: COMMERCIAL

## 2022-07-08 ENCOUNTER — HOSPITAL ENCOUNTER (OUTPATIENT)
Dept: RADIOLOGY | Facility: MEDICAL CENTER | Age: 47
End: 2022-07-08
Payer: COMMERCIAL

## 2022-07-08 DIAGNOSIS — R10.9 RIGHT-SIDED ABDOMINAL PAIN OF UNKNOWN CAUSE: ICD-10-CM

## 2022-07-08 DIAGNOSIS — R10.13 EPIGASTRIC PAIN: ICD-10-CM

## 2022-07-08 PROCEDURE — 74177 CT ABD & PELVIS W/CONTRAST: CPT

## 2022-07-08 PROCEDURE — 700117 HCHG RX CONTRAST REV CODE 255

## 2022-07-08 RX ADMIN — IOHEXOL 100 ML: 350 INJECTION, SOLUTION INTRAVENOUS at 15:28

## 2022-07-09 ENCOUNTER — HOSPITAL ENCOUNTER (OUTPATIENT)
Dept: LAB | Facility: MEDICAL CENTER | Age: 47
End: 2022-07-09
Attending: PHYSICIAN ASSISTANT
Payer: COMMERCIAL

## 2022-07-09 ENCOUNTER — HOSPITAL ENCOUNTER (OUTPATIENT)
Dept: LAB | Facility: MEDICAL CENTER | Age: 47
End: 2022-07-09
Payer: COMMERCIAL

## 2022-07-09 DIAGNOSIS — Z13.220 LIPID SCREENING: ICD-10-CM

## 2022-07-09 LAB
ALBUMIN SERPL BCP-MCNC: 5 G/DL (ref 3.2–4.9)
ALBUMIN/GLOB SERPL: 2 G/DL
ALP SERPL-CCNC: 49 U/L (ref 30–99)
ALT SERPL-CCNC: 13 U/L (ref 2–50)
ANION GAP SERPL CALC-SCNC: 9 MMOL/L (ref 7–16)
AST SERPL-CCNC: 17 U/L (ref 12–45)
BASOPHILS # BLD AUTO: 1.8 % (ref 0–1.8)
BASOPHILS # BLD: 0.09 K/UL (ref 0–0.12)
BILIRUB SERPL-MCNC: 0.9 MG/DL (ref 0.1–1.5)
BUN SERPL-MCNC: 12 MG/DL (ref 8–22)
CALCIUM SERPL-MCNC: 10.1 MG/DL (ref 8.5–10.5)
CHLORIDE SERPL-SCNC: 104 MMOL/L (ref 96–112)
CHOLEST SERPL-MCNC: 182 MG/DL (ref 100–199)
CO2 SERPL-SCNC: 27 MMOL/L (ref 20–33)
CREAT SERPL-MCNC: 1.03 MG/DL (ref 0.5–1.4)
EOSINOPHIL # BLD AUTO: 0.13 K/UL (ref 0–0.51)
EOSINOPHIL NFR BLD: 2.6 % (ref 0–6.9)
ERYTHROCYTE [DISTWIDTH] IN BLOOD BY AUTOMATED COUNT: 41.1 FL (ref 35.9–50)
FASTING STATUS PATIENT QL REPORTED: NORMAL
GFR SERPLBLD CREATININE-BSD FMLA CKD-EPI: 67 ML/MIN/1.73 M 2
GLOBULIN SER CALC-MCNC: 2.5 G/DL (ref 1.9–3.5)
GLUCOSE SERPL-MCNC: 72 MG/DL (ref 65–99)
HCT VFR BLD AUTO: 42.5 % (ref 37–47)
HDLC SERPL-MCNC: 72 MG/DL
HGB BLD-MCNC: 14.2 G/DL (ref 12–16)
IMM GRANULOCYTES # BLD AUTO: 0 K/UL (ref 0–0.11)
IMM GRANULOCYTES NFR BLD AUTO: 0 % (ref 0–0.9)
LDLC SERPL CALC-MCNC: 100 MG/DL
LYMPHOCYTES # BLD AUTO: 2.01 K/UL (ref 1–4.8)
LYMPHOCYTES NFR BLD: 39.8 % (ref 22–41)
MCH RBC QN AUTO: 30.7 PG (ref 27–33)
MCHC RBC AUTO-ENTMCNC: 33.4 G/DL (ref 33.6–35)
MCV RBC AUTO: 91.8 FL (ref 81.4–97.8)
MONOCYTES # BLD AUTO: 0.46 K/UL (ref 0–0.85)
MONOCYTES NFR BLD AUTO: 9.1 % (ref 0–13.4)
NEUTROPHILS # BLD AUTO: 2.36 K/UL (ref 2–7.15)
NEUTROPHILS NFR BLD: 46.7 % (ref 44–72)
NRBC # BLD AUTO: 0 K/UL
NRBC BLD-RTO: 0 /100 WBC
PLATELET # BLD AUTO: 186 K/UL (ref 164–446)
PMV BLD AUTO: 11.9 FL (ref 9–12.9)
POTASSIUM SERPL-SCNC: 4 MMOL/L (ref 3.6–5.5)
PROT SERPL-MCNC: 7.5 G/DL (ref 6–8.2)
RBC # BLD AUTO: 4.63 M/UL (ref 4.2–5.4)
SODIUM SERPL-SCNC: 140 MMOL/L (ref 135–145)
TRIGL SERPL-MCNC: 51 MG/DL (ref 0–149)
WBC # BLD AUTO: 5.1 K/UL (ref 4.8–10.8)

## 2022-07-09 PROCEDURE — 82784 ASSAY IGA/IGD/IGG/IGM EACH: CPT

## 2022-07-09 PROCEDURE — 36415 COLL VENOUS BLD VENIPUNCTURE: CPT

## 2022-07-09 PROCEDURE — 83516 IMMUNOASSAY NONANTIBODY: CPT

## 2022-07-09 PROCEDURE — 85025 COMPLETE CBC W/AUTO DIFF WBC: CPT

## 2022-07-09 PROCEDURE — 80053 COMPREHEN METABOLIC PANEL: CPT

## 2022-07-09 PROCEDURE — 80061 LIPID PANEL: CPT

## 2022-07-11 LAB — IGA SERPL-MCNC: 238 MG/DL (ref 68–408)

## 2022-07-13 LAB — GLIADIN PEPTIDE+TTG IGA+IGG SER QL IA: 17 UNITS (ref 0–19)

## 2022-07-18 PROCEDURE — RXMED WILLOW AMBULATORY MEDICATION CHARGE

## 2022-07-28 ENCOUNTER — HOSPITAL ENCOUNTER (OUTPATIENT)
Dept: RADIOLOGY | Facility: MEDICAL CENTER | Age: 47
End: 2022-07-28
Attending: PHYSICIAN ASSISTANT
Payer: COMMERCIAL

## 2022-07-28 DIAGNOSIS — Z12.31 VISIT FOR SCREENING MAMMOGRAM: ICD-10-CM

## 2022-07-28 PROCEDURE — 77063 BREAST TOMOSYNTHESIS BI: CPT

## 2022-07-30 ENCOUNTER — PHARMACY VISIT (OUTPATIENT)
Dept: PHARMACY | Facility: MEDICAL CENTER | Age: 47
End: 2022-07-30
Payer: COMMERCIAL

## 2022-07-30 PROCEDURE — RXMED WILLOW AMBULATORY MEDICATION CHARGE: Performed by: INTERNAL MEDICINE

## 2022-08-02 ENCOUNTER — PHARMACY VISIT (OUTPATIENT)
Dept: PHARMACY | Facility: MEDICAL CENTER | Age: 47
End: 2022-08-02
Payer: COMMERCIAL

## 2022-08-24 PROCEDURE — RXMED WILLOW AMBULATORY MEDICATION CHARGE

## 2022-08-28 PROCEDURE — RXMED WILLOW AMBULATORY MEDICATION CHARGE: Performed by: INTERNAL MEDICINE

## 2022-08-30 ENCOUNTER — PHARMACY VISIT (OUTPATIENT)
Dept: PHARMACY | Facility: MEDICAL CENTER | Age: 47
End: 2022-08-30
Payer: COMMERCIAL

## 2022-08-31 ENCOUNTER — PHARMACY VISIT (OUTPATIENT)
Dept: PHARMACY | Facility: MEDICAL CENTER | Age: 47
End: 2022-08-31
Payer: COMMERCIAL

## 2022-09-21 PROCEDURE — RXMED WILLOW AMBULATORY MEDICATION CHARGE: Performed by: PHYSICIAN ASSISTANT

## 2022-09-22 PROCEDURE — RXMED WILLOW AMBULATORY MEDICATION CHARGE

## 2022-09-28 ENCOUNTER — PHARMACY VISIT (OUTPATIENT)
Dept: PHARMACY | Facility: MEDICAL CENTER | Age: 47
End: 2022-09-28
Payer: COMMERCIAL

## 2022-11-04 ENCOUNTER — HOSPITAL ENCOUNTER (OUTPATIENT)
Dept: LAB | Facility: MEDICAL CENTER | Age: 47
End: 2022-11-04
Attending: INTERNAL MEDICINE
Payer: COMMERCIAL

## 2022-11-04 LAB
T4 FREE SERPL-MCNC: 1.5 NG/DL (ref 0.93–1.7)
TSH SERPL DL<=0.005 MIU/L-ACNC: 3.34 UIU/ML (ref 0.38–5.33)

## 2022-11-04 PROCEDURE — 84432 ASSAY OF THYROGLOBULIN: CPT

## 2022-11-04 PROCEDURE — 84439 ASSAY OF FREE THYROXINE: CPT

## 2022-11-04 PROCEDURE — 86800 THYROGLOBULIN ANTIBODY: CPT

## 2022-11-04 PROCEDURE — 36415 COLL VENOUS BLD VENIPUNCTURE: CPT

## 2022-11-04 PROCEDURE — 84443 ASSAY THYROID STIM HORMONE: CPT

## 2022-11-07 ENCOUNTER — PHARMACY VISIT (OUTPATIENT)
Dept: PHARMACY | Facility: MEDICAL CENTER | Age: 47
End: 2022-11-07
Payer: COMMERCIAL

## 2022-11-07 LAB
THYROGLOB AB SERPL-ACNC: <0.9 IU/ML (ref 0–4)
THYROGLOB SERPL-MCNC: 5.4 NG/ML (ref 1.3–31.8)
THYROGLOB SERPL-MCNC: NORMAL NG/ML (ref 1.3–31.8)

## 2022-11-07 PROCEDURE — RXMED WILLOW AMBULATORY MEDICATION CHARGE: Performed by: INTERNAL MEDICINE

## 2022-11-07 PROCEDURE — RXMED WILLOW AMBULATORY MEDICATION CHARGE

## 2022-11-10 PROCEDURE — RXMED WILLOW AMBULATORY MEDICATION CHARGE: Performed by: PHYSICIAN ASSISTANT

## 2022-11-15 ENCOUNTER — PHARMACY VISIT (OUTPATIENT)
Dept: PHARMACY | Facility: MEDICAL CENTER | Age: 47
End: 2022-11-15
Payer: COMMERCIAL

## 2022-11-24 PROCEDURE — RXMED WILLOW AMBULATORY MEDICATION CHARGE: Performed by: INTERNAL MEDICINE

## 2022-12-01 PROCEDURE — RXMED WILLOW AMBULATORY MEDICATION CHARGE

## 2022-12-07 ENCOUNTER — PHARMACY VISIT (OUTPATIENT)
Dept: PHARMACY | Facility: MEDICAL CENTER | Age: 47
End: 2022-12-07
Payer: COMMERCIAL

## 2022-12-14 PROCEDURE — RXMED WILLOW AMBULATORY MEDICATION CHARGE: Performed by: OBSTETRICS & GYNECOLOGY

## 2022-12-19 ENCOUNTER — PHARMACY VISIT (OUTPATIENT)
Dept: PHARMACY | Facility: MEDICAL CENTER | Age: 47
End: 2022-12-19
Payer: COMMERCIAL

## 2023-02-10 ENCOUNTER — HOSPITAL ENCOUNTER (OUTPATIENT)
Dept: LAB | Facility: MEDICAL CENTER | Age: 48
End: 2023-02-10
Attending: PHYSICIAN ASSISTANT
Payer: COMMERCIAL

## 2023-02-10 LAB
T4 FREE SERPL-MCNC: 1.63 NG/DL (ref 0.93–1.7)
TSH SERPL DL<=0.005 MIU/L-ACNC: 0.17 UIU/ML (ref 0.38–5.33)

## 2023-02-10 PROCEDURE — 84443 ASSAY THYROID STIM HORMONE: CPT

## 2023-02-10 PROCEDURE — 84439 ASSAY OF FREE THYROXINE: CPT

## 2023-02-10 PROCEDURE — 36415 COLL VENOUS BLD VENIPUNCTURE: CPT

## 2023-03-01 PROCEDURE — RXMED WILLOW AMBULATORY MEDICATION CHARGE: Performed by: INTERNAL MEDICINE

## 2023-03-13 ENCOUNTER — PHARMACY VISIT (OUTPATIENT)
Dept: PHARMACY | Facility: MEDICAL CENTER | Age: 48
End: 2023-03-13
Payer: MEDICARE

## 2023-04-10 ENCOUNTER — TELEPHONE (OUTPATIENT)
Dept: CARDIOLOGY | Facility: MEDICAL CENTER | Age: 48
End: 2023-04-10
Payer: COMMERCIAL

## 2023-04-11 NOTE — TELEPHONE ENCOUNTER
Dennis Mujica Pt was seen within the last year at [facility] with Dr. Moulton.   She has not had any testing or labs done recently.    PH: [phone] (home) [phone] (work)     Thank you  Coretta WEBSTER

## 2023-04-13 ENCOUNTER — TELEPHONE (OUTPATIENT)
Dept: CARDIOLOGY | Facility: MEDICAL CENTER | Age: 48
End: 2023-04-13
Payer: COMMERCIAL

## 2023-04-13 NOTE — TELEPHONE ENCOUNTER
Patient stated that she has been seen at [facility] with Dr. Moulton.    Phone Number # [phone]  Fax Number [phone]    Fax Confirmation have been received and scan in to [facility]

## 2023-04-17 PROCEDURE — RXMED WILLOW AMBULATORY MEDICATION CHARGE: Performed by: PHYSICIAN ASSISTANT

## 2023-04-24 NOTE — PROGRESS NOTES
Cardiology Initial Consultation Note    Date of note:    4/25/2023    Primary Care Provider: Marcela Young P.A.-C.  Referring Provider: Self-referred    Patient Name: Aixa Yuen   YOB: 1975  MRN:              [id number]    Chief Complaint: Establish care    History of Present Illness: Ms. Aixa Yuen is a 47 y.o. female whose current medical problems include GERD, asthma, hypothyroidism, and PVCs who is here for cardiac consultation to establish care at [facility].    The patient was previously seen in cardiology clinic at [facility] for PVCs, last visit 10/17/2022.  The patient was doing well with lifestyle modifications, and no changes were made to her medications.    The patient presents today to establish care.  The patient reports feeling well today.  She denies any chest pain or shortness of breath on exertion.  She exercises daily, she is a physical therapist.  She denies any orthopnea, PND, or leg swelling.  No palpitations.  No syncope or presyncopal episodes.    The patient reports that her home BP has been systolic 130s/80s.  She is going through a divorce, and there is a lot of stress in her life.    Cardiovascular Risk Factors:  1. Smoking status: Never smoker  2. Type II Diabetes Mellitus: None  Lab Results   Component Value Date/Time    HBA1C 5.7 11/17/2020 08:02 AM     3. Hypertension: None  4. Dyslipidemia: Diet controlled  Cholesterol,Tot   Date Value Ref Range Status   07/09/2022 182 100 - 199 mg/dL Final     LDL   Date Value Ref Range Status   07/09/2022 100 (H) <100 mg/dL Final     HDL   Date Value Ref Range Status   07/09/2022 72 >=40 mg/dL Final     Triglycerides   Date Value Ref Range Status   07/09/2022 51 0 - 149 mg/dL Final     5. Family history of early Coronary Artery Disease in a first degree relative (Male less than 55 years of age; Female less than 65 years of age): None  6.  Obesity and/or Metabolic Syndrome: Body mass index is 23.86  "kg/m².  7. Sedentary lifestyle: Active    Review of Systems   Constitutional: Negative for fever, malaise/fatigue and night sweats.   Cardiovascular:  Negative for chest pain, dyspnea on exertion, irregular heartbeat, leg swelling, near-syncope, orthopnea, palpitations, paroxysmal nocturnal dyspnea and syncope.   Respiratory:  Negative for cough, shortness of breath and wheezing.    Gastrointestinal:  Negative for abdominal pain, diarrhea, nausea and vomiting.   Neurological:  Negative for dizziness, focal weakness and weakness.       All other systems reviewed and are negative.     Current Outpatient Medications   Medication Sig Dispense Refill    B Complex Vitamins (B COMPLEX VITAMIN PO) Take  by mouth.      SYNTHROID 75 MCG Tab Take 1 tablet in the morning on an empty stomach Orally daily 90 Tablet 0    Calcium Carbonate-Vitamin D 600-200 MG-UNIT Tab Take  by mouth.      albuterol 108 (90 Base) MCG/ACT Aero Soln inhalation aerosol Inhale 2 Puffs.      Tiotropium Bromide Monohydrate 1.25 MCG/ACT Aero Soln Inhale 2 Puffs by mouth every day. 4 g 11    Omega-3 Fatty Acids (FISH OIL PO) Take  by mouth.      Cyanocobalamin (VITAMIN B 12 PO) Vitamin B 12      MAGNESIUM PO Magnesium       No current facility-administered medications for this visit.         Allergies   Allergen Reactions    Bactrim Ds Unspecified     Generalized muscle aches    Nitrofurantoin Cough and Unspecified     Other reaction(s): dry cough  persistent cough      Aleve [Naproxen Sodium] Diarrhea    Lactose Nausea    Naproxen Diarrhea    Oxycodone-Acetaminophen Itching    Percocet [Oxycodone-Acetaminophen] Itching    Pseudoephedrine Unspecified     Bad headache/ wired         Physical Exam:  Ambulatory Vitals  BP (!) 135/90   Pulse 72   Resp 12   Ht 1.626 m (5' 4\")   Wt 63 kg (139 lb)   SpO2 97%    Oxygen Therapy:  Pulse Oximetry: 97 %  BP Readings from Last 4 Encounters:   04/25/23 (!) 135/90   06/29/22 118/62   10/04/21 158/82   01/01/18 " 120/78       Weight/BMI: Body mass index is 23.86 kg/m².  Wt Readings from Last 4 Encounters:   04/25/23 63 kg (139 lb)   06/29/22 59.4 kg (131 lb)   10/04/21 63.4 kg (139 lb 12.4 oz)   01/01/18 68 kg (150 lb)         General: Well appearing and in no apparent distress  Eyes: nl conjunctiva, no icteric sclera  ENT: wearing a mask, normal external appearance of ears  Neck: no visible JVP,  no carotid bruits  Lungs: normal respiratory effort, CTAB  Heart: RRR, no murmurs, no rubs or gallops,  no edema bilateral lower extremities. No LV/RV heave on cardiac palpatation. + bilateral radial pulses.  + bilateral dp pulses.   Abdomen: soft, non tender, non distended, no masses, normal bowel sounds.  No HSM.  Extremities/MSK: no clubbing, no cyanosis  Neurological: No focal sensory deficits  Psychiatric: Appropriate affect, A/O x 3, intact judgement and insight  Skin: Warm extremities      Lab Data Review:  Lab Results   Component Value Date/Time    CHOLSTRLTOT 182 07/09/2022 07:21 AM     (H) 07/09/2022 07:21 AM    HDL 72 07/09/2022 07:21 AM    TRIGLYCERIDE 51 07/09/2022 07:21 AM       Lab Results   Component Value Date/Time    SODIUM 140 07/09/2022 07:21 AM    POTASSIUM 4.0 07/09/2022 07:21 AM    CHLORIDE 104 07/09/2022 07:21 AM    CO2 27 07/09/2022 07:21 AM    GLUCOSE 72 07/09/2022 07:21 AM    BUN 12 07/09/2022 07:21 AM    CREATININE 1.03 07/09/2022 07:21 AM     Lab Results   Component Value Date/Time    ALKPHOSPHAT 49 07/09/2022 07:21 AM    ASTSGOT 17 07/09/2022 07:21 AM    ALTSGPT 13 07/09/2022 07:21 AM    TBILIRUBIN 0.9 07/09/2022 07:21 AM      Lab Results   Component Value Date/Time    WBC 5.1 07/09/2022 07:21 AM     Lab Results   Component Value Date/Time    HBA1C 5.7 11/17/2020 08:02 AM         Cardiac Imaging and Procedures Review:    EKG dated 4/25/2023: My personal interpretation is sinus rhythm    No prior echocardiogram    Assessment & Plan     1. PVC's (premature ventricular contractions)  EKG     EC-ECHOCARDIOGRAM COMPLETE W/O CONT      2. Dyslipidemia        3. History of heart murmur in childhood  EC-ECHOCARDIOGRAM COMPLETE W/O CONT      4. Pericardial effusion  EC-ECHOCARDIOGRAM COMPLETE W/O CONT      5. Elevated BP without diagnosis of hypertension  EKG    EC-ECHOCARDIOGRAM COMPLETE W/O CONT            Shared Medical Decision Making:    PVCs  Asymptomatic  -Continue lifestyle management    Elevated BP without diagnosis of hypertension  BP elevated this visit.  Patient reports that at home her BP has been 130s/80s.  There has been a lot of stress at home lately.  -She would like to hold off medications at this time and work on stress management.  Counseled the patient to measure BP at home.  If home BP is above 130/80, will need to add antihypertensives.  -Continue heart healthy diet and exercise    Dyslipidemia  The 10-year ASCVD risk score (Kaitlyn DK, et al., 2019) is: 0.8%  -Continue lifestyle modifications and heart healthy diet    History of childhood murmur  -No murmurs appreciated this visit    Small pericardial effusion  Incidentally found on CT abdomen pelvis  -Obtain echocardiogram to assess LVEF and pericardium    All of the patient's excellent questions were answered to the best of my knowledge and to her satisfaction.  It was a pleasure seeing Ms. Aixa Yuen in my clinic today. Return in about 4 months (around 8/25/2023). Patient is aware to call the cardiology clinic with any questions or concerns.      Angel Davis MD  [facility]  [place] [facility], [address]  [address] [address]  Phone: [phone]  Fax: [phone]

## 2023-04-25 ENCOUNTER — PHARMACY VISIT (OUTPATIENT)
Dept: PHARMACY | Facility: MEDICAL CENTER | Age: 48
End: 2023-04-25
Payer: MEDICARE

## 2023-04-25 ENCOUNTER — OFFICE VISIT (OUTPATIENT)
Dept: CARDIOLOGY | Facility: MEDICAL CENTER | Age: 48
End: 2023-04-25
Payer: COMMERCIAL

## 2023-04-25 VITALS
DIASTOLIC BLOOD PRESSURE: 90 MMHG | OXYGEN SATURATION: 97 % | WEIGHT: 139 LBS | HEART RATE: 72 BPM | HEIGHT: 64 IN | RESPIRATION RATE: 12 BRPM | SYSTOLIC BLOOD PRESSURE: 135 MMHG | BODY MASS INDEX: 23.73 KG/M2

## 2023-04-25 DIAGNOSIS — R03.0 ELEVATED BP WITHOUT DIAGNOSIS OF HYPERTENSION: ICD-10-CM

## 2023-04-25 DIAGNOSIS — I49.3 PVC'S (PREMATURE VENTRICULAR CONTRACTIONS): ICD-10-CM

## 2023-04-25 DIAGNOSIS — Z86.79 HISTORY OF HEART MURMUR IN CHILDHOOD: ICD-10-CM

## 2023-04-25 DIAGNOSIS — E78.5 DYSLIPIDEMIA: ICD-10-CM

## 2023-04-25 DIAGNOSIS — I31.39 PERICARDIAL EFFUSION: ICD-10-CM

## 2023-04-25 LAB — EKG IMPRESSION: NORMAL

## 2023-04-25 PROCEDURE — 93000 ELECTROCARDIOGRAM COMPLETE: CPT | Performed by: STUDENT IN AN ORGANIZED HEALTH CARE EDUCATION/TRAINING PROGRAM

## 2023-04-25 PROCEDURE — 99204 OFFICE O/P NEW MOD 45 MIN: CPT | Performed by: STUDENT IN AN ORGANIZED HEALTH CARE EDUCATION/TRAINING PROGRAM

## 2023-04-25 ASSESSMENT — ENCOUNTER SYMPTOMS
DIARRHEA: 0
IRREGULAR HEARTBEAT: 0
WEAKNESS: 0
COUGH: 0
SYNCOPE: 0
WHEEZING: 0
ABDOMINAL PAIN: 0
PND: 0
SHORTNESS OF BREATH: 0
VOMITING: 0
DIZZINESS: 0
FOCAL WEAKNESS: 0
NAUSEA: 0
NEAR-SYNCOPE: 0
ORTHOPNEA: 0
NIGHT SWEATS: 0
PALPITATIONS: 0
FEVER: 0
DYSPNEA ON EXERTION: 0

## 2023-04-25 ASSESSMENT — FIBROSIS 4 INDEX: FIB4 SCORE: 1.24

## 2023-05-20 ENCOUNTER — HOSPITAL ENCOUNTER (EMERGENCY)
Facility: MEDICAL CENTER | Age: 48
End: 2023-05-21
Attending: EMERGENCY MEDICINE
Payer: COMMERCIAL

## 2023-05-20 DIAGNOSIS — I10 HYPERTENSION, UNSPECIFIED TYPE: Primary | ICD-10-CM

## 2023-05-20 DIAGNOSIS — R10.13 EPIGASTRIC PAIN: ICD-10-CM

## 2023-05-20 LAB — EKG IMPRESSION: NORMAL

## 2023-05-20 PROCEDURE — 93005 ELECTROCARDIOGRAM TRACING: CPT | Performed by: EMERGENCY MEDICINE

## 2023-05-20 PROCEDURE — 99283 EMERGENCY DEPT VISIT LOW MDM: CPT

## 2023-05-20 PROCEDURE — 84484 ASSAY OF TROPONIN QUANT: CPT

## 2023-05-20 PROCEDURE — 80053 COMPREHEN METABOLIC PANEL: CPT

## 2023-05-20 PROCEDURE — 85025 COMPLETE CBC W/AUTO DIFF WBC: CPT

## 2023-05-20 PROCEDURE — 36415 COLL VENOUS BLD VENIPUNCTURE: CPT

## 2023-05-20 PROCEDURE — 83690 ASSAY OF LIPASE: CPT

## 2023-05-20 RX ORDER — ACETAMINOPHEN 500 MG
1000 TABLET ORAL EVERY 6 HOURS PRN
Qty: 20 TABLET | Refills: 0 | Status: SHIPPED | OUTPATIENT
Start: 2023-05-20 | End: 2024-02-03

## 2023-05-20 RX ORDER — IBUPROFEN 800 MG/1
800 TABLET ORAL EVERY 8 HOURS PRN
Qty: 20 TABLET | Refills: 0 | Status: SHIPPED | OUTPATIENT
Start: 2023-05-20 | End: 2024-02-03

## 2023-05-20 ASSESSMENT — FIBROSIS 4 INDEX: FIB4 SCORE: 1.24

## 2023-05-21 ENCOUNTER — APPOINTMENT (OUTPATIENT)
Dept: RADIOLOGY | Facility: MEDICAL CENTER | Age: 48
End: 2023-05-21
Attending: EMERGENCY MEDICINE
Payer: COMMERCIAL

## 2023-05-21 VITALS
HEART RATE: 54 BPM | OXYGEN SATURATION: 100 % | TEMPERATURE: 97.7 F | DIASTOLIC BLOOD PRESSURE: 82 MMHG | HEIGHT: 64 IN | WEIGHT: 140.65 LBS | SYSTOLIC BLOOD PRESSURE: 143 MMHG | RESPIRATION RATE: 13 BRPM | BODY MASS INDEX: 24.01 KG/M2

## 2023-05-21 LAB
ALBUMIN SERPL BCP-MCNC: 4.7 G/DL (ref 3.2–4.9)
ALBUMIN/GLOB SERPL: 2 G/DL
ALP SERPL-CCNC: 54 U/L (ref 30–99)
ALT SERPL-CCNC: 12 U/L (ref 2–50)
ANION GAP SERPL CALC-SCNC: 11 MMOL/L (ref 7–16)
AST SERPL-CCNC: 15 U/L (ref 12–45)
BASOPHILS # BLD AUTO: 1 % (ref 0–1.8)
BASOPHILS # BLD: 0.08 K/UL (ref 0–0.12)
BILIRUB SERPL-MCNC: 0.4 MG/DL (ref 0.1–1.5)
BUN SERPL-MCNC: 11 MG/DL (ref 8–22)
CALCIUM ALBUM COR SERPL-MCNC: 9.1 MG/DL (ref 8.5–10.5)
CALCIUM SERPL-MCNC: 9.7 MG/DL (ref 8.4–10.2)
CHLORIDE SERPL-SCNC: 104 MMOL/L (ref 96–112)
CO2 SERPL-SCNC: 25 MMOL/L (ref 20–33)
CREAT SERPL-MCNC: 0.73 MG/DL (ref 0.5–1.4)
EOSINOPHIL # BLD AUTO: 0.17 K/UL (ref 0–0.51)
EOSINOPHIL NFR BLD: 2.2 % (ref 0–6.9)
ERYTHROCYTE [DISTWIDTH] IN BLOOD BY AUTOMATED COUNT: 40.8 FL (ref 35.9–50)
GFR SERPLBLD CREATININE-BSD FMLA CKD-EPI: 100 ML/MIN/1.73 M 2
GLOBULIN SER CALC-MCNC: 2.4 G/DL (ref 1.9–3.5)
GLUCOSE SERPL-MCNC: 91 MG/DL (ref 65–99)
HCT VFR BLD AUTO: 41.5 % (ref 37–47)
HGB BLD-MCNC: 13.7 G/DL (ref 12–16)
IMM GRANULOCYTES # BLD AUTO: 0.01 K/UL (ref 0–0.11)
IMM GRANULOCYTES NFR BLD AUTO: 0.1 % (ref 0–0.9)
LIPASE SERPL-CCNC: 31 U/L (ref 7–58)
LYMPHOCYTES # BLD AUTO: 2.17 K/UL (ref 1–4.8)
LYMPHOCYTES NFR BLD: 28.3 % (ref 22–41)
MCH RBC QN AUTO: 30.6 PG (ref 27–33)
MCHC RBC AUTO-ENTMCNC: 33 G/DL (ref 33.6–35)
MCV RBC AUTO: 92.8 FL (ref 81.4–97.8)
MONOCYTES # BLD AUTO: 0.72 K/UL (ref 0–0.85)
MONOCYTES NFR BLD AUTO: 9.4 % (ref 0–13.4)
NEUTROPHILS # BLD AUTO: 4.52 K/UL (ref 2–7.15)
NEUTROPHILS NFR BLD: 59 % (ref 44–72)
NRBC # BLD AUTO: 0 K/UL
NRBC BLD-RTO: 0 /100 WBC
PLATELET # BLD AUTO: 214 K/UL (ref 164–446)
PMV BLD AUTO: 10.7 FL (ref 9–12.9)
POTASSIUM SERPL-SCNC: 3.5 MMOL/L (ref 3.6–5.5)
PROT SERPL-MCNC: 7.1 G/DL (ref 6–8.2)
RBC # BLD AUTO: 4.47 M/UL (ref 4.2–5.4)
SODIUM SERPL-SCNC: 140 MMOL/L (ref 135–145)
TROPONIN T SERPL-MCNC: <6 NG/L (ref 6–19)
WBC # BLD AUTO: 7.7 K/UL (ref 4.8–10.8)

## 2023-05-21 PROCEDURE — 71045 X-RAY EXAM CHEST 1 VIEW: CPT

## 2023-05-21 NOTE — ED TRIAGE NOTES
"Chief Complaint   Patient presents with    Epigastric Pain    Hypertension     46 yo female ambulates to triage with reports of a 2 days history of epigastric pain and elevated BP. Denies n/v or SOB.  Reports at home BP at rest was 135/102. Epigastric pain is towards the left lower rib cage     BP (!) 161/101   Pulse 60   Temp 36.5 °C (97.7 °F) (Temporal)   Resp 16   Ht 1.626 m (5' 4\")   Wt 63.8 kg (140 lb 10.5 oz)   SpO2 100%   BMI 24.14 kg/m²    "

## 2023-05-21 NOTE — DISCHARGE INSTRUCTIONS
I would continue to work with your medication, therapy and diet to improve your blood pressure but I do want you to keep track of this over the next few weeks.  Please take your blood pressure at rest in the morning and evening after resting for 5 minutes.  Recorded in a journal.  And then presented to your PCP and they may be able to give you some medications that will help with this.  Today your work-up was negative which is very encouraging.  I recommend continued management with your PCP for further follow-up.  If any worsening symptoms, please return to the ED for further evaluation treatment.  Thank for coming in today.

## 2023-05-21 NOTE — ED NOTES
Rounded with Erp regarding triage protocol labs. Patient does not need second troponin at this time.   Patient is stable for discharge at this time, anticipatory guidance provided, close follow-up is encouraged, and ED return instructions have been detailed. Patient is both agreeable to the disposition and plan and discharged home in ambulatory state and in good condition.      Rx education provided, Pt verbalized understanding.

## 2023-05-21 NOTE — ED PROVIDER NOTES
ED Provider Note    Scribed for No att. providers found by Tommy Poe. 5/20/2023  11:41 PM    Primary care provider: Pcp Not In Computer  Means of arrival: private vehicle   History obtained from: Patient  History limited by: None    CHIEF COMPLAINT  Chief Complaint   Patient presents with    Hypertension     Patient has had elevated blood pressure the past few days.     Rib Pain     Thursday night was raking and patient believes she has a muscle strain.      EXTERNAL RECORDS REVIEWED  Outpatient Notes seen on 4/25/2023 at outpt visit for PVC's     HPI/ROS  LIMITATION TO HISTORY   Select: : None  OUTSIDE HISTORIAN(S):  None    HPI  Aixasa Harmony Yuen is a 47 y.o. female who presents to the Emergency Department a few days of some mild epigastric pain.  Patient states that she was raking her yard and carrying heavy bags on Thursday and developed what she thinks is likely abdominal muscle strain in her epigastric region.  However she noticed that she has been hypertensive on her regular blood pressure readings at home as high as around 150 systolic and 102 diastolic.  The next was concerned so came to ED.  She does admit that she is extremely stressed that she is going through a divorce currently, but she has started meditating and is in counseling but still feels slightly stressed and think this is contributed to her hypertension.  She denies any true chest pain or shortness of breath, no nausea or vomiting, no headedness or dizziness, no diarrhea or constipation, no dysuria or hematuria.  She drinks alcohol very rarely, no drug or tobacco abuse.  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  No cardiac history and she takes only Synthroid and Spiriva.      REVIEW OF SYSTEMS  As above, all other systems reviewed and are negative.   See HPI for further details.     PAST MEDICAL HISTORY   has a past medical history of Alcohol use, Chronic back pain, and Left knee pain.  SURGICAL HISTORY   has  a past surgical history that includes arthroscopy, knee (01/01/2013); appendectomy laparoscopic (05/18/2015); and thyroid lobectomy (Right, 2020).  SOCIAL HISTORY  Social History     Tobacco Use    Smoking status: Never    Smokeless tobacco: Never   Vaping Use    Vaping Use: Never used   Substance Use Topics    Alcohol use: Yes     Alcohol/week: 2.4 oz     Types: 4 Glasses of wine per week     Comment: few drinks a week    Drug use: No      Social History     Substance and Sexual Activity   Drug Use No     FAMILY HISTORY  Family History   Problem Relation Age of Onset    Stroke Father     Alcohol/Drug Father         alcoholic    Stroke Paternal Grandmother     Alcohol/Drug Mother         alcoholic    Hypertension Mother     Cancer Paternal Aunt         melanoma    Cancer Paternal Aunt         lung ca, smoker    Thyroid Paternal Aunt     Allergies Neg Hx     Heart Disease Neg Hx     Diabetes Neg Hx     Hyperlipidemia Neg Hx     Psychiatric Illness Neg Hx      CURRENT MEDICATIONS  Home Medications       Reviewed by Juanita Eid R.N. (Registered Nurse) on 05/20/23 at 2235  Med List Status: Not Addressed     Medication Last Dose Status   albuterol 108 (90 Base) MCG/ACT Aero Soln inhalation aerosol  Active   B Complex Vitamins (B COMPLEX VITAMIN PO)  Active   Calcium Carbonate-Vitamin D 600-200 MG-UNIT Tab  Active   Cyanocobalamin (VITAMIN B 12 PO)  Active   MAGNESIUM PO  Active   Omega-3 Fatty Acids (FISH OIL PO)  Active   SYNTHROID 75 MCG Tab  Active   Tiotropium Bromide Monohydrate 1.25 MCG/ACT Aero Soln  Active                  ALLERGIES  Allergies   Allergen Reactions    Bactrim Ds Unspecified     Generalized muscle aches    Nitrofurantoin Cough and Unspecified     Other reaction(s): dry cough  persistent cough      Aleve [Naproxen Sodium] Diarrhea    Lactose Nausea    Naproxen Diarrhea    Oxycodone-Acetaminophen Itching    Percocet [Oxycodone-Acetaminophen] Itching    Pseudoephedrine Unspecified     Bad headache/  "wired       PHYSICAL EXAM    VITAL SIGNS:   Vitals:    05/20/23 2230 05/20/23 2236 05/20/23 2341   BP:  (!) 161/101 (!) 151/95   Pulse:  60    Resp:  16    Temp:  36.5 °C (97.7 °F)    TempSrc:  Temporal    SpO2:  100%    Weight: 63.8 kg (140 lb 10.5 oz)     Height: 1.626 m (5' 4\")       Vitals: My interpretation: HTN, not tachycardic, afebrile, not hypoxic    Reinterpretation of vitals: unchanged     Cardiac Monitor Interpretation: The cardiac monitor revealed normal Sinus Rhythm as interpreted by me. The cardiac monitor was ordered secondary to the patient's history of HTN and to monitor for dysrhythmia and/or tachycardia.    PE:   Gen: sitting comfortably, speaking clearly, appears in no acute distress   ENT: Mucous membranes moist, posterior pharynx clear, uvula midline, nares patent bilaterally   Neck: Supple, FROM  Pulmonary: Lungs are clear to auscultation bilaterally. No tachypnea  CV:  RRR, no murmur appreciated, pulses 2+ in both upper and lower extremities  Abdomen: soft, NT/ND; no rebound/guarding  : no CVA or suprapubic tenderness   Neuro: A&Ox4 (person, place, time, situation), speech fluent, gait steady, no focal deficits appreciated  Skin: No rash or lesions.  No pallor or jaundice.  No cyanosis.  Warm and dry.     DIAGNOSTIC STUDIES / PROCEDURES    LABS  Results for orders placed or performed during the hospital encounter of 05/20/23   CBC WITH DIFFERENTIAL   Result Value Ref Range    WBC 7.7 4.8 - 10.8 K/uL    RBC 4.47 4.20 - 5.40 M/uL    Hemoglobin 13.7 12.0 - 16.0 g/dL    Hematocrit 41.5 37.0 - 47.0 %    MCV 92.8 81.4 - 97.8 fL    MCH 30.6 27.0 - 33.0 pg    MCHC 33.0 (L) 33.6 - 35.0 g/dL    RDW 40.8 35.9 - 50.0 fL    Platelet Count 214 164 - 446 K/uL    MPV 10.7 9.0 - 12.9 fL    Neutrophils-Polys 59.00 44.00 - 72.00 %    Lymphocytes 28.30 22.00 - 41.00 %    Monocytes 9.40 0.00 - 13.40 %    Eosinophils 2.20 0.00 - 6.90 %    Basophils 1.00 0.00 - 1.80 %    Immature Granulocytes 0.10 0.00 - 0.90 % "    Nucleated RBC 0.00 /100 WBC    Neutrophils (Absolute) 4.52 2.00 - 7.15 K/uL    Lymphs (Absolute) 2.17 1.00 - 4.80 K/uL    Monos (Absolute) 0.72 0.00 - 0.85 K/uL    Eos (Absolute) 0.17 0.00 - 0.51 K/uL    Baso (Absolute) 0.08 0.00 - 0.12 K/uL    Immature Granulocytes (abs) 0.01 0.00 - 0.11 K/uL    NRBC (Absolute) 0.00 K/uL   COMP METABOLIC PANEL   Result Value Ref Range    Sodium 140 135 - 145 mmol/L    Potassium 3.5 (L) 3.6 - 5.5 mmol/L    Chloride 104 96 - 112 mmol/L    Co2 25 20 - 33 mmol/L    Anion Gap 11.0 7.0 - 16.0    Glucose 91 65 - 99 mg/dL    Bun 11 8 - 22 mg/dL    Creatinine 0.73 0.50 - 1.40 mg/dL    Calcium 9.7 8.4 - 10.2 mg/dL    AST(SGOT) 15 12 - 45 U/L    ALT(SGPT) 12 2 - 50 U/L    Alkaline Phosphatase 54 30 - 99 U/L    Total Bilirubin 0.4 0.1 - 1.5 mg/dL    Albumin 4.7 3.2 - 4.9 g/dL    Total Protein 7.1 6.0 - 8.2 g/dL    Globulin 2.4 1.9 - 3.5 g/dL    A-G Ratio 2.0 g/dL   TROPONIN   Result Value Ref Range    Troponin T <6 6 - 19 ng/L   LIPASE   Result Value Ref Range    Lipase 31 7 - 58 U/L   CORRECTED CALCIUM   Result Value Ref Range    Correct Calcium 9.1 8.5 - 10.5 mg/dL   ESTIMATED GFR   Result Value Ref Range    GFR (CKD-EPI) 100 >60 mL/min/1.73 m 2   EKG (NOW)   Result Value Ref Range    Report       [facility] Emergency Dept.    Test Date:  2023  Pt Name:    SOPHIE HWANG               Department: [facility]  MRN:        [id number]                      Room:  Gender:     Female                       Technician: [id number]  :        [id number]                   Requested By:MARAL ANN  Order #:    [id number]                    Reading MD: Maral Ann    Measurements  Intervals                                Axis  Rate:       61                           P:          16  MT:         163                          QRS:        76  QRSD:       83                           T:          54  QT:         412  QTc:        415    Interpretive Statements  Sinus  rhythm  Anteroseptal infarct, age indeterminate  Compared to ECG 04/25/2023 08:10:06  Myocardial infarct finding now present  Electronically Signed On 5- 23:15:11 PDT by Tommy Poe        All labs reviewed by me. Labs were compared to prior labs if they were available. Significant for no leukocytosis, no anemia, normal electrolytes, normal renal function, normal liver enzymes, normal bilirubin, troponin negative, lipase normal    RADIOLOGY  I have independently interpreted the diagnostic imaging associated with this visit and am waiting the final reading from the radiologist.   My preliminary interpretation is a follows: No acute focal consolidations, no cardiomegaly  Radiologist interpretation is as follows:  DX-CHEST-PORTABLE (1 VIEW)   Final Result         1. No acute cardiopulmonary abnormalities are identified.        COURSE & MEDICAL DECISION MAKING  Nursing notes, VS, PMSFHx, labs, imaging, EKG reviewed in chart.    Heart Score: Low     ED Observation Status? Yes; I am placing the patient in to an observation status due to a diagnostic uncertainty as well as therapeutic intensity. Patient placed in observation status at 11:43 PM, 5/20/2023.     Observation plan is as follows: diagnostic uncertainty and need for therapeutic intensity     Upon Reevaluation, the patient's condition has: Improved; and will be discharged.    Patient discharged from ED Observation status at 12:56 AM (Time) 5/21/202 (Date).     Ddx: HTN, MI, PE     MDM: 11:41 PM Aixa Yuen is a 47 y.o. female who presented with some mild epigastric pain and hypertension at home.  Patient been stressed recently going through a divorce and thinks her blood pressure is elevated because of this.  She think she likely strained her abdominal muscles by doing some excessive raking and yard work carrying heavy bags of grass over the last few days but is unsure.  Denies any history of similar symptoms.,  To systems are negative.   Vital signs show mild hypertension upon arrival here but otherwise are unremarkable.  Physical exam is benign she has a unremarkable cardiac and abdominal exam, no reproducible tenderness of the chest, no reproducible tenderness in the epigastrium.  Her EKG is unremarkable.  Considering her symptoms however and her age, we will proceed with cardiac work-up here in the ED including EKG, labs, chest x-ray to rule out atypical chest pain presentation.  Labs however came out reassuring with normal CBC, CMP, lipase and troponin.  EKG unremarkable.  Chest x-ray without any focal consolidation.  As pain has been ongoing for several days, no occasion for second troponin.  At this time patient is ambulatory, well-appearing and in no acute distress.  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  She has medical knowledge regarding return precautions discussed with her.  She verbalized understand strict return precautions outpatient follow plan and is amenable.  Rx for Tylenol Motrin to help with symptomatic relief.  No GERD symptoms at this time will not start anti-GERD medications.    Hypertension counseling:   I spent a total of 5 minutes counseling patient on blood pressure control and management.  We discussed medication management with PCP.  I recommended keeping a blood pressure journal, taking the blood pressure once in the morning once evening after resting for 5 minutes.  Presented in general to PCP and discussing medication management or adjustment.  Discussed hypertensive emergencies and what signs and symptoms to be monitoring for and to return to the ED for.  Patient verbalized understanding and was receptive of counseling.    ADDITIONAL PROBLEM LIST AND DISPOSITION    I have discussed management of the patient with the following physicians and [place]'s:  none    Discussion of management with other QHP or appropriate source(s): None     Escalation of care considered, and ultimately not  performed:acute inpatient care management, however at this time, the patient is most appropriate for outpatient management    Barriers to care at this time, including but not limited to:  None .     Decision tools and prescription drugs considered including, but not limited to: Pain Medications Tylenol and Motrin  .    FINAL IMPRESSION  1. Hypertension, unspecified type Acute   2. Epigastric pain Acute      The note accurately reflects work and decisions made by me.  Tommy Poe  5/20/2023  11:41 PM

## 2023-06-13 PROCEDURE — RXMED WILLOW AMBULATORY MEDICATION CHARGE: Performed by: INTERNAL MEDICINE

## 2023-06-14 ENCOUNTER — OFFICE VISIT (OUTPATIENT)
Dept: URGENT CARE | Facility: CLINIC | Age: 48
End: 2023-06-14
Payer: COMMERCIAL

## 2023-06-14 ENCOUNTER — APPOINTMENT (OUTPATIENT)
Dept: RADIOLOGY | Facility: IMAGING CENTER | Age: 48
End: 2023-06-14
Attending: NURSE PRACTITIONER
Payer: COMMERCIAL

## 2023-06-14 VITALS
OXYGEN SATURATION: 97 % | HEIGHT: 64 IN | HEART RATE: 94 BPM | RESPIRATION RATE: 14 BRPM | SYSTOLIC BLOOD PRESSURE: 124 MMHG | TEMPERATURE: 98.5 F | BODY MASS INDEX: 23.49 KG/M2 | WEIGHT: 137.6 LBS | DIASTOLIC BLOOD PRESSURE: 84 MMHG
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
## 2023-06-14 DIAGNOSIS — T36.95XA ANTIBIOTIC-INDUCED YEAST INFECTION: ICD-10-CM

## 2023-06-14 DIAGNOSIS — B37.9 ANTIBIOTIC-INDUCED YEAST INFECTION: ICD-10-CM

## 2023-06-14 PROCEDURE — 73590 X-RAY EXAM OF LOWER LEG: CPT | Mod: TC,FY,RT | Performed by: NURSE PRACTITIONER

## 2023-06-14 PROCEDURE — 90715 TDAP VACCINE 7 YRS/> IM: CPT | Performed by: NURSE PRACTITIONER

## 2023-06-14 PROCEDURE — 3074F SYST BP LT 130 MM HG: CPT | Performed by: NURSE PRACTITIONER

## 2023-06-14 PROCEDURE — 99214 OFFICE O/P EST MOD 30 MIN: CPT | Mod: 25 | Performed by: NURSE PRACTITIONER

## 2023-06-14 PROCEDURE — 90471 IMMUNIZATION ADMIN: CPT | Performed by: NURSE PRACTITIONER

## 2023-06-14 PROCEDURE — 3079F DIAST BP 80-89 MM HG: CPT | Performed by: NURSE PRACTITIONER

## 2023-06-14 RX ORDER — FLUCONAZOLE 150 MG/1
TABLET ORAL
Qty: 2 TABLET | Refills: 0 | Status: SHIPPED | OUTPATIENT
Start: 2023-06-14

## 2023-06-14 RX ORDER — AMOXICILLIN AND CLAVULANATE POTASSIUM 875; 125 MG/1; MG/1
1 TABLET, FILM COATED ORAL 2 TIMES DAILY
Qty: 10 TABLET | Refills: 0 | Status: SHIPPED | OUTPATIENT
Start: 2023-06-14 | End: 2023-06-19

## 2023-06-14 ASSESSMENT — FIBROSIS 4 INDEX: FIB4 SCORE: 0.99

## 2023-06-14 NOTE — PROGRESS NOTES
Patient has consented to treatment and for use of patient information for treatment and billing purposes.    Date: 06/14/23     Arrival Mode: Private Vehicle    Chief Complaint:    Chief Complaint   Patient presents with    Dog Bite     R leg         History of Present Illness: 47 y.o.  female presents to clinic with a dog bite of her right lower leg.  Patient states that she was walking her dog who is a least and unfortunately a stranger's dog got away from their owner.  And ran up to her dog.  She states that the dogs did begin fighting and she was trying to break up the fight.  She states unfortunately the stranger's dog who she believes was a palpated by her right lower leg.  She denies any numbness or tingling distally.  She is unsure if she is up-to-date on her tetanus vaccination.  Dog bite form completed.      ROS:    As stated in HPI     Pertinent Medical History:  Past Medical History:   Diagnosis Date    Alcohol use     Chronic back pain     Left knee pain         Pertinent Surgical History:  Past Surgical History:   Procedure Laterality Date    THYROID LOBECTOMY Right 2020    APPENDECTOMY LAPAROSCOPIC  05/18/2015    Procedure: APPENDECTOMY LAPAROSCOPIC;  Surgeon: Yared Braswell M.D.;  Location: SURGERY [facility];  Service:     ARTHROSCOPY, KNEE  01/01/2013        Pertinent Medications:    Current Outpatient Medications on File Prior to Visit   Medication Sig Dispense Refill    Tiotropium Bromide Monohydrate (SPIRIVA RESPIMAT) 1.25 MCG/ACT Aero Soln Inhale 2 Puffs by mouth every day. 4 g 11    Cyanocobalamin (VITAMIN B 12 PO) Vitamin B 12      MAGNESIUM PO Magnesium      B Complex Vitamins (B COMPLEX VITAMIN PO) Take  by mouth.      SYNTHROID 75 MCG Tab Take 1 tablet in the morning on an empty stomach Orally daily 90 Tablet 0    Calcium Carbonate-Vitamin D 600-200 MG-UNIT Tab Take  by mouth.      albuterol 108 (90 Base) MCG/ACT Aero Soln inhalation aerosol Inhale 2 Puffs.      Omega-3 Fatty Acids  (FISH OIL PO) Take  by mouth.       No current facility-administered medications on file prior to visit.        Allergies:    Bactrim ds, Nitrofurantoin, Aleve [naproxen sodium], Lactose, Naproxen, Oxycodone-acetaminophen, Percocet [oxycodone-acetaminophen], and Pseudoephedrine     Social History:  Social History     Tobacco Use    Smoking status: Never    Smokeless tobacco: Never   Vaping Use    Vaping Use: Never used   Substance Use Topics    Alcohol use: Yes     Alcohol/week: 2.4 oz     Types: 4 Glasses of wine per week     Comment: few drinks a week    Drug use: No        No LMP recorded. (Menstrual status: Other).           Physical Exam:    Vitals:    06/14/23 1512   BP: 124/84   Pulse: 94   Resp: 14   Temp: 36.9 °C (98.5 °F)   SpO2: 97%             Physical Exam  Constitutional:       Appearance: Normal appearance. She is not ill-appearing.   Skin:         Neurological:      Mental Status: She is alert and oriented to person, place, and time.      Gait: Gait is intact.                  Diagnostics:    DX-TIBIA AND FIBULA 2 VIEWS RIGHT    Result Date: 6/14/2023 6/14/2023 3:32 PM HISTORY/REASON FOR EXAM:  Pain/Deformity Following Trauma; dog bite, eval f body TECHNIQUE/EXAM DESCRIPTION AND NUMBER OF VIEWS: Tib-fib 2 views COMPARISON: None FINDINGS: There is soft tissue swelling along the lateral proximal calf. No radiopaque foreign body identified. No fracture identified.     No evidence of fracture or radiopaque foreign body.        Diagnostics interpreted by myself.  Do agree with radiology read.    Medical Decision making and clinic course :  I personally reviewed prior external notes and test results pertinent to today's visit. Pt is clinically stable at today's acute urgent care visit.  No acute distress noted. Appropriate for outpatient care at this time. Shared decision-making was utilized with patient for treatment plan.    Pleasant nontoxic-appearing 47-year-old female presenting clinic status post  dog bite.  The dog was not acting rapid discussed indications for rabies prophylaxis.  Advised that if patient does have concerns for rabies prophylaxis she must seek high-level care through the [facility] health department.  Patient states this is not a concern of hers at this time.  Due to the dog being a puppy did obtain an x-ray to rule out foreign body.  Fortunately negative.  Patient's tetanus is updated in clinic today did also send for Augmentin for prophylactic antibiotics.  The wound was dressed.      The patient remained stable during the urgent care visit.    Plan:    Medication discussed included indication for use and the potential  benefits and side effects.         1. Dog bite, initial encounter    - amoxicillin-clavulanate (AUGMENTIN) 875-125 MG Tab; Take 1 Tablet by mouth 2 times a day for 5 days.  Dispense: 10 Tablet; Refill: 0  - Tdap =>6yo IM  - DX-TIBIA AND FIBULA 2 VIEWS RIGHT; Future    2. Antibiotic-induced yeast infection    - fluconazole (DIFLUCAN) 150 MG tablet; Take one tablet orally for yeast infection, if symptoms persist, may repeat treatment after 72 hours.  Dispense: 2 Tablet; Refill: 0        All of the patient's questions were answered to their satisfaction at the time of discharge.    Follow up:        Patient was encouraged to monitor symptoms closely. Those signs and symptoms which would warrant concern and mandate seeking a higher level of service through the emergency department discussed at length and included in discharge papers.  Patient stated agreement and understanding of this plan of care.    Disposition:  Home in stable condition       Voice Recognition Disclaimer:  Portions of this document were created using voice recognition software. The software does have a chance of producing errors of grammar and possibly content. I have made every reasonable attempt to correct obvious errors, but there may be errors of grammar and possibly content that I did not discover before  finalizing the documentation.    LASHONDA Hernandez.

## 2023-06-22 ENCOUNTER — PHARMACY VISIT (OUTPATIENT)
Dept: PHARMACY | Facility: MEDICAL CENTER | Age: 48
End: 2023-06-22
Payer: MEDICARE

## 2023-07-27 ENCOUNTER — TELEPHONE (OUTPATIENT)
Dept: CARDIOLOGY | Facility: MEDICAL CENTER | Age: 48
End: 2023-07-27
Payer: COMMERCIAL

## 2023-07-27 NOTE — TELEPHONE ENCOUNTER
RADHAMES          Caller: Aixa Yuen      Topic/issue: Patient was asking for a call back to go over and review the results of her ECHO with her.      Callback Number: [phone]    Thank you    -Jacob LOUIS

## 2023-07-28 PROCEDURE — RXMED WILLOW AMBULATORY MEDICATION CHARGE: Performed by: STUDENT IN AN ORGANIZED HEALTH CARE EDUCATION/TRAINING PROGRAM

## 2023-07-31 PROCEDURE — RXMED WILLOW AMBULATORY MEDICATION CHARGE: Performed by: FAMILY MEDICINE

## 2023-08-02 ENCOUNTER — PHARMACY VISIT (OUTPATIENT)
Dept: PHARMACY | Facility: MEDICAL CENTER | Age: 48
End: 2023-08-02
Payer: MEDICARE

## 2023-08-25 ENCOUNTER — HOSPITAL ENCOUNTER (OUTPATIENT)
Dept: LAB | Facility: MEDICAL CENTER | Age: 48
End: 2023-08-25
Attending: INTERNAL MEDICINE
Payer: COMMERCIAL

## 2023-08-25 LAB — PTH-INTACT SERPL-MCNC: 30.9 PG/ML (ref 14–72)

## 2023-08-25 PROCEDURE — 83970 ASSAY OF PARATHORMONE: CPT

## 2023-08-25 PROCEDURE — 36415 COLL VENOUS BLD VENIPUNCTURE: CPT

## 2023-09-01 ENCOUNTER — APPOINTMENT (OUTPATIENT)
Dept: SPORTS MEDICINE | Facility: CLINIC | Age: 48
End: 2023-09-01
Payer: COMMERCIAL

## 2023-09-24 PROCEDURE — RXMED WILLOW AMBULATORY MEDICATION CHARGE: Performed by: INTERNAL MEDICINE

## 2023-10-02 ENCOUNTER — PHARMACY VISIT (OUTPATIENT)
Dept: PHARMACY | Facility: MEDICAL CENTER | Age: 48
End: 2023-10-02
Payer: MEDICARE

## 2023-10-02 PROCEDURE — RXMED WILLOW AMBULATORY MEDICATION CHARGE: Performed by: FAMILY MEDICINE

## 2023-10-06 PROCEDURE — RXMED WILLOW AMBULATORY MEDICATION CHARGE: Performed by: FAMILY MEDICINE

## 2023-10-09 ENCOUNTER — PHARMACY VISIT (OUTPATIENT)
Dept: PHARMACY | Facility: MEDICAL CENTER | Age: 48
End: 2023-10-09
Payer: MEDICARE

## 2023-10-31 ENCOUNTER — HOSPITAL ENCOUNTER (OUTPATIENT)
Dept: LAB | Facility: MEDICAL CENTER | Age: 48
End: 2023-10-31
Attending: FAMILY MEDICINE
Payer: COMMERCIAL

## 2023-10-31 LAB
25(OH)D3 SERPL-MCNC: 46 NG/ML (ref 30–100)
ALBUMIN SERPL BCP-MCNC: 4.9 G/DL (ref 3.2–4.9)
ALBUMIN/GLOB SERPL: 2.1 G/DL
ALP SERPL-CCNC: 55 U/L (ref 30–99)
ALT SERPL-CCNC: 14 U/L (ref 2–50)
ANION GAP SERPL CALC-SCNC: 11 MMOL/L (ref 7–16)
AST SERPL-CCNC: 21 U/L (ref 12–45)
BILIRUB SERPL-MCNC: 0.5 MG/DL (ref 0.1–1.5)
BUN SERPL-MCNC: 12 MG/DL (ref 8–22)
CA-I SERPL-SCNC: 1.3 MMOL/L (ref 1.1–1.3)
CALCIUM ALBUM COR SERPL-MCNC: 9.3 MG/DL (ref 8.5–10.5)
CALCIUM SERPL-MCNC: 10 MG/DL (ref 8.5–10.5)
CHLORIDE SERPL-SCNC: 104 MMOL/L (ref 96–112)
CO2 SERPL-SCNC: 26 MMOL/L (ref 20–33)
CREAT SERPL-MCNC: 0.86 MG/DL (ref 0.5–1.4)
GFR SERPLBLD CREATININE-BSD FMLA CKD-EPI: 82 ML/MIN/1.73 M 2
GLOBULIN SER CALC-MCNC: 2.3 G/DL (ref 1.9–3.5)
GLUCOSE SERPL-MCNC: 98 MG/DL (ref 65–99)
POTASSIUM SERPL-SCNC: 4.1 MMOL/L (ref 3.6–5.5)
PROT SERPL-MCNC: 7.2 G/DL (ref 6–8.2)
PTH-INTACT SERPL-MCNC: 37.2 PG/ML (ref 14–72)
SODIUM SERPL-SCNC: 141 MMOL/L (ref 135–145)

## 2023-10-31 PROCEDURE — 82330 ASSAY OF CALCIUM: CPT

## 2023-10-31 PROCEDURE — 82306 VITAMIN D 25 HYDROXY: CPT

## 2023-10-31 PROCEDURE — 83970 ASSAY OF PARATHORMONE: CPT

## 2023-10-31 PROCEDURE — 82542 COL CHROMOTOGRAPHY QUAL/QUAN: CPT

## 2023-10-31 PROCEDURE — 80053 COMPREHEN METABOLIC PANEL: CPT

## 2023-10-31 PROCEDURE — RXMED WILLOW AMBULATORY MEDICATION CHARGE: Performed by: FAMILY MEDICINE

## 2023-10-31 PROCEDURE — 36415 COLL VENOUS BLD VENIPUNCTURE: CPT

## 2023-10-31 PROCEDURE — 82652 VIT D 1 25-DIHYDROXY: CPT

## 2023-11-02 ENCOUNTER — PHARMACY VISIT (OUTPATIENT)
Dept: PHARMACY | Facility: MEDICAL CENTER | Age: 48
End: 2023-11-02
Payer: MEDICARE

## 2023-11-03 LAB — 1,25(OH)2D3 SERPL-MCNC: 72.8 PG/ML (ref 19.9–79.3)

## 2023-11-05 LAB — PTH RELATED PROT SERPL-SCNC: 2.9 PMOL/L (ref 0–3.4)

## 2023-12-15 PROCEDURE — RXMED WILLOW AMBULATORY MEDICATION CHARGE: Performed by: INTERNAL MEDICINE

## 2023-12-22 ENCOUNTER — PHARMACY VISIT (OUTPATIENT)
Dept: PHARMACY | Facility: MEDICAL CENTER | Age: 48
End: 2023-12-22
Payer: MEDICARE

## 2024-02-01 PROCEDURE — RXMED WILLOW AMBULATORY MEDICATION CHARGE: Performed by: FAMILY MEDICINE

## 2024-02-02 ENCOUNTER — PHARMACY VISIT (OUTPATIENT)
Dept: PHARMACY | Facility: MEDICAL CENTER | Age: 49
End: 2024-02-02
Payer: MEDICARE

## 2024-02-21 ENCOUNTER — PHARMACY VISIT (OUTPATIENT)
Dept: PHARMACY | Facility: MEDICAL CENTER | Age: 49
End: 2024-02-21
Payer: MEDICARE

## 2024-02-21 PROCEDURE — RXMED WILLOW AMBULATORY MEDICATION CHARGE: Performed by: STUDENT IN AN ORGANIZED HEALTH CARE EDUCATION/TRAINING PROGRAM

## 2024-02-21 PROCEDURE — RXMED WILLOW AMBULATORY MEDICATION CHARGE: Performed by: FAMILY MEDICINE

## 2024-02-21 RX ORDER — NEOMYCIN SULFATE, POLYMYXIN B SULFATE, AND DEXAMETHASONE 3.5; 10000; 1 MG/G; [USP'U]/G; MG/G
OINTMENT OPHTHALMIC
Qty: 3.5 G | Refills: 1 | OUTPATIENT
Start: 2024-02-21

## 2024-03-01 ENCOUNTER — PHARMACY VISIT (OUTPATIENT)
Dept: PHARMACY | Facility: MEDICAL CENTER | Age: 49
End: 2024-03-01
Payer: MEDICARE

## 2024-03-01 PROCEDURE — RXMED WILLOW AMBULATORY MEDICATION CHARGE: Performed by: FAMILY MEDICINE

## 2024-03-08 ENCOUNTER — APPOINTMENT (OUTPATIENT)
Dept: LAB | Facility: MEDICAL CENTER | Age: 49
End: 2024-03-08
Payer: COMMERCIAL

## 2024-03-25 PROCEDURE — RXMED WILLOW AMBULATORY MEDICATION CHARGE: Performed by: FAMILY MEDICINE

## 2024-03-25 PROCEDURE — RXMED WILLOW AMBULATORY MEDICATION CHARGE: Performed by: INTERNAL MEDICINE

## 2024-03-29 ENCOUNTER — HOSPITAL ENCOUNTER (OUTPATIENT)
Dept: LAB | Facility: MEDICAL CENTER | Age: 49
End: 2024-03-29
Attending: FAMILY MEDICINE
Payer: COMMERCIAL

## 2024-03-29 LAB
ALBUMIN SERPL BCP-MCNC: 4.5 G/DL (ref 3.2–4.9)
ALBUMIN/GLOB SERPL: 1.8 G/DL
ALP SERPL-CCNC: 55 U/L (ref 30–99)
ALT SERPL-CCNC: 20 U/L (ref 2–50)
ANION GAP SERPL CALC-SCNC: 9 MMOL/L (ref 7–16)
AST SERPL-CCNC: 22 U/L (ref 12–45)
BILIRUB SERPL-MCNC: 0.8 MG/DL (ref 0.1–1.5)
BUN SERPL-MCNC: 16 MG/DL (ref 8–22)
CALCIUM ALBUM COR SERPL-MCNC: 9.1 MG/DL (ref 8.5–10.5)
CALCIUM SERPL-MCNC: 9.5 MG/DL (ref 8.4–10.2)
CHLORIDE SERPL-SCNC: 105 MMOL/L (ref 96–112)
CHOLEST SERPL-MCNC: 222 MG/DL (ref 100–199)
CO2 SERPL-SCNC: 27 MMOL/L (ref 20–33)
CREAT SERPL-MCNC: 0.69 MG/DL (ref 0.5–1.4)
EST. AVERAGE GLUCOSE BLD GHB EST-MCNC: 111 MG/DL
FASTING STATUS PATIENT QL REPORTED: NORMAL
GFR SERPLBLD CREATININE-BSD FMLA CKD-EPI: 105 ML/MIN/1.73 M 2
GLOBULIN SER CALC-MCNC: 2.5 G/DL (ref 1.9–3.5)
GLUCOSE SERPL-MCNC: 78 MG/DL (ref 65–99)
HAV IGM SERPL QL IA: NORMAL
HBA1C MFR BLD: 5.5 % (ref 4–5.6)
HBV CORE IGM SER QL: NORMAL
HBV SURFACE AG SER QL: NORMAL
HCV AB SER QL: NORMAL
HDLC SERPL-MCNC: 85 MG/DL
LDLC SERPL CALC-MCNC: 124 MG/DL
POTASSIUM SERPL-SCNC: 3.9 MMOL/L (ref 3.6–5.5)
PROT SERPL-MCNC: 7 G/DL (ref 6–8.2)
SODIUM SERPL-SCNC: 141 MMOL/L (ref 135–145)
TRIGL SERPL-MCNC: 66 MG/DL (ref 0–149)
TSH SERPL DL<=0.005 MIU/L-ACNC: 1.78 UIU/ML (ref 0.38–5.33)

## 2024-03-29 PROCEDURE — 80061 LIPID PANEL: CPT

## 2024-03-29 PROCEDURE — 80053 COMPREHEN METABOLIC PANEL: CPT

## 2024-03-29 PROCEDURE — 36415 COLL VENOUS BLD VENIPUNCTURE: CPT

## 2024-03-29 PROCEDURE — 84443 ASSAY THYROID STIM HORMONE: CPT

## 2024-03-29 PROCEDURE — 83036 HEMOGLOBIN GLYCOSYLATED A1C: CPT

## 2024-03-29 PROCEDURE — 80074 ACUTE HEPATITIS PANEL: CPT

## 2024-04-05 ENCOUNTER — PHARMACY VISIT (OUTPATIENT)
Dept: PHARMACY | Facility: MEDICAL CENTER | Age: 49
End: 2024-04-05
Payer: MEDICARE

## 2024-04-25 PROCEDURE — RXMED WILLOW AMBULATORY MEDICATION CHARGE: Performed by: FAMILY MEDICINE

## 2024-04-26 ENCOUNTER — PHARMACY VISIT (OUTPATIENT)
Dept: PHARMACY | Facility: MEDICAL CENTER | Age: 49
End: 2024-04-26
Payer: MEDICARE

## 2024-06-14 ENCOUNTER — HOSPITAL ENCOUNTER (OUTPATIENT)
Dept: LAB | Facility: MEDICAL CENTER | Age: 49
End: 2024-06-14
Attending: OBSTETRICS & GYNECOLOGY
Payer: COMMERCIAL

## 2024-06-14 LAB
HIV 1+2 AB+HIV1 P24 AG SERPL QL IA: NORMAL
T PALLIDUM AB SER QL IA: NORMAL

## 2024-06-14 PROCEDURE — 86780 TREPONEMA PALLIDUM: CPT

## 2024-06-14 PROCEDURE — 36415 COLL VENOUS BLD VENIPUNCTURE: CPT

## 2024-06-14 PROCEDURE — 86694 HERPES SIMPLEX NES ANTBDY: CPT

## 2024-06-14 PROCEDURE — 87389 HIV-1 AG W/HIV-1&-2 AB AG IA: CPT

## 2024-06-17 LAB — HSV1+2 IGM SER IA-ACNC: 0.79 IV

## 2024-07-02 PROCEDURE — RXMED WILLOW AMBULATORY MEDICATION CHARGE: Performed by: FAMILY MEDICINE

## 2024-07-02 PROCEDURE — RXMED WILLOW AMBULATORY MEDICATION CHARGE: Performed by: OBSTETRICS & GYNECOLOGY

## 2024-07-03 ENCOUNTER — PHARMACY VISIT (OUTPATIENT)
Dept: PHARMACY | Facility: MEDICAL CENTER | Age: 49
End: 2024-07-03
Payer: MEDICARE

## 2024-07-03 RX ORDER — TIOTROPIUM BROMIDE INHALATION SPRAY 1.56 UG/1
2 SPRAY, METERED RESPIRATORY (INHALATION)
Qty: 4 G | Refills: 11 | Status: CANCELLED | OUTPATIENT
Start: 2024-07-03

## 2024-07-05 PROCEDURE — RXMED WILLOW AMBULATORY MEDICATION CHARGE: Performed by: FAMILY MEDICINE

## 2024-07-12 ENCOUNTER — PHARMACY VISIT (OUTPATIENT)
Dept: PHARMACY | Facility: MEDICAL CENTER | Age: 49
End: 2024-07-12
Payer: MEDICARE

## 2024-07-12 RX ORDER — MELOXICAM 15 MG/1
15 TABLET ORAL DAILY
Qty: 30 TABLET | Refills: 2 | Status: CANCELLED | OUTPATIENT
Start: 2024-07-12

## 2024-07-26 PROCEDURE — RXMED WILLOW AMBULATORY MEDICATION CHARGE: Performed by: OBSTETRICS & GYNECOLOGY

## 2024-08-13 PROCEDURE — RXMED WILLOW AMBULATORY MEDICATION CHARGE: Performed by: STUDENT IN AN ORGANIZED HEALTH CARE EDUCATION/TRAINING PROGRAM

## 2024-08-16 ENCOUNTER — PHARMACY VISIT (OUTPATIENT)
Dept: PHARMACY | Facility: MEDICAL CENTER | Age: 49
End: 2024-08-16
Payer: MEDICARE

## 2024-08-16 PROCEDURE — RXMED WILLOW AMBULATORY MEDICATION CHARGE: Performed by: STUDENT IN AN ORGANIZED HEALTH CARE EDUCATION/TRAINING PROGRAM

## 2024-09-09 PROCEDURE — RXMED WILLOW AMBULATORY MEDICATION CHARGE: Performed by: STUDENT IN AN ORGANIZED HEALTH CARE EDUCATION/TRAINING PROGRAM

## 2024-09-10 ENCOUNTER — PHARMACY VISIT (OUTPATIENT)
Dept: PHARMACY | Facility: MEDICAL CENTER | Age: 49
End: 2024-09-10
Payer: MEDICARE

## 2024-09-10 PROCEDURE — RXMED WILLOW AMBULATORY MEDICATION CHARGE: Performed by: STUDENT IN AN ORGANIZED HEALTH CARE EDUCATION/TRAINING PROGRAM

## 2024-09-10 RX ORDER — LEVOTHYROXINE SODIUM 75 UG/1
TABLET ORAL
Qty: 90 TABLET | Refills: 1 | OUTPATIENT
Start: 2024-09-09

## 2024-09-10 RX ORDER — AMLODIPINE BESYLATE 5 MG/1
TABLET ORAL
Qty: 90 TABLET | Refills: 0 | OUTPATIENT
Start: 2024-09-09

## 2024-09-10 RX ORDER — TIOTROPIUM BROMIDE INHALATION SPRAY 1.56 UG/1
SPRAY, METERED RESPIRATORY (INHALATION)
Qty: 12 G | Refills: 3 | OUTPATIENT
Start: 2024-09-09

## 2024-10-04 PROCEDURE — RXMED WILLOW AMBULATORY MEDICATION CHARGE: Performed by: ORTHOPAEDIC SURGERY

## 2024-10-15 ENCOUNTER — PHARMACY VISIT (OUTPATIENT)
Dept: PHARMACY | Facility: MEDICAL CENTER | Age: 49
End: 2024-10-15
Payer: MEDICARE

## 2024-11-15 ENCOUNTER — APPOINTMENT (OUTPATIENT)
Dept: MEDICAL GROUP | Facility: LAB | Age: 49
End: 2024-11-15
Payer: COMMERCIAL

## 2024-11-15 PROCEDURE — RXMED WILLOW AMBULATORY MEDICATION CHARGE: Performed by: STUDENT IN AN ORGANIZED HEALTH CARE EDUCATION/TRAINING PROGRAM

## 2024-11-18 PROCEDURE — RXMED WILLOW AMBULATORY MEDICATION CHARGE: Performed by: STUDENT IN AN ORGANIZED HEALTH CARE EDUCATION/TRAINING PROGRAM

## 2024-11-22 ENCOUNTER — PHARMACY VISIT (OUTPATIENT)
Dept: PHARMACY | Facility: MEDICAL CENTER | Age: 49
End: 2024-11-22
Payer: MEDICARE

## 2024-11-22 ENCOUNTER — HOSPITAL ENCOUNTER (OUTPATIENT)
Dept: LAB | Facility: MEDICAL CENTER | Age: 49
End: 2024-11-22
Payer: COMMERCIAL

## 2024-11-22 LAB
ALBUMIN SERPL BCP-MCNC: 4.6 G/DL (ref 3.2–4.9)
ALBUMIN/GLOB SERPL: 1.8 G/DL
ALP SERPL-CCNC: 45 U/L (ref 30–99)
ALT SERPL-CCNC: 21 U/L (ref 2–50)
ANION GAP SERPL CALC-SCNC: 10 MMOL/L (ref 7–16)
AST SERPL-CCNC: 22 U/L (ref 12–45)
BILIRUB SERPL-MCNC: 0.6 MG/DL (ref 0.1–1.5)
BUN SERPL-MCNC: 14 MG/DL (ref 8–22)
CALCIUM ALBUM COR SERPL-MCNC: 9.2 MG/DL (ref 8.5–10.5)
CALCIUM SERPL-MCNC: 9.7 MG/DL (ref 8.5–10.5)
CHLORIDE SERPL-SCNC: 106 MMOL/L (ref 96–112)
CHOLEST SERPL-MCNC: 200 MG/DL (ref 100–199)
CO2 SERPL-SCNC: 25 MMOL/L (ref 20–33)
CREAT SERPL-MCNC: 0.75 MG/DL (ref 0.5–1.4)
EST. AVERAGE GLUCOSE BLD GHB EST-MCNC: 108 MG/DL
GFR SERPLBLD CREATININE-BSD FMLA CKD-EPI: 96 ML/MIN/1.73 M 2
GLOBULIN SER CALC-MCNC: 2.6 G/DL (ref 1.9–3.5)
GLUCOSE SERPL-MCNC: 81 MG/DL (ref 65–99)
HBA1C MFR BLD: 5.4 % (ref 4–5.6)
HDLC SERPL-MCNC: 78 MG/DL
LDLC SERPL CALC-MCNC: 112 MG/DL
POTASSIUM SERPL-SCNC: 4.8 MMOL/L (ref 3.6–5.5)
PROT SERPL-MCNC: 7.2 G/DL (ref 6–8.2)
SODIUM SERPL-SCNC: 141 MMOL/L (ref 135–145)
T3FREE SERPL-MCNC: 2.62 PG/ML (ref 2–4.4)
T4 FREE SERPL-MCNC: 1.52 NG/DL (ref 0.93–1.7)
TRIGL SERPL-MCNC: 52 MG/DL (ref 0–149)
TSH SERPL-ACNC: 2.11 UIU/ML (ref 0.35–5.5)

## 2024-11-22 PROCEDURE — 84481 FREE ASSAY (FT-3): CPT

## 2024-11-22 PROCEDURE — 84439 ASSAY OF FREE THYROXINE: CPT

## 2024-11-22 PROCEDURE — 84443 ASSAY THYROID STIM HORMONE: CPT

## 2024-11-22 PROCEDURE — 36415 COLL VENOUS BLD VENIPUNCTURE: CPT

## 2024-11-22 PROCEDURE — 80061 LIPID PANEL: CPT

## 2024-11-22 PROCEDURE — 80053 COMPREHEN METABOLIC PANEL: CPT

## 2024-11-22 PROCEDURE — 83036 HEMOGLOBIN GLYCOSYLATED A1C: CPT

## 2024-11-27 PROCEDURE — RXMED WILLOW AMBULATORY MEDICATION CHARGE: Performed by: STUDENT IN AN ORGANIZED HEALTH CARE EDUCATION/TRAINING PROGRAM

## 2024-12-06 ENCOUNTER — OFFICE VISIT (OUTPATIENT)
Dept: MEDICAL GROUP | Facility: MEDICAL CENTER | Age: 49
End: 2024-12-06
Payer: COMMERCIAL

## 2024-12-06 ENCOUNTER — PHARMACY VISIT (OUTPATIENT)
Dept: PHARMACY | Facility: MEDICAL CENTER | Age: 49
End: 2024-12-06
Payer: MEDICARE

## 2024-12-06 VITALS
WEIGHT: 133 LBS | SYSTOLIC BLOOD PRESSURE: 152 MMHG | HEART RATE: 80 BPM | HEIGHT: 64 IN | OXYGEN SATURATION: 100 % | DIASTOLIC BLOOD PRESSURE: 98 MMHG | BODY MASS INDEX: 22.71 KG/M2 | TEMPERATURE: 97.3 F

## 2024-12-06 DIAGNOSIS — E78.5 DYSLIPIDEMIA: ICD-10-CM

## 2024-12-06 DIAGNOSIS — F41.1 GAD (GENERALIZED ANXIETY DISORDER): ICD-10-CM

## 2024-12-06 PROCEDURE — 99214 OFFICE O/P EST MOD 30 MIN: CPT | Performed by: FAMILY MEDICINE

## 2024-12-06 PROCEDURE — RXMED WILLOW AMBULATORY MEDICATION CHARGE: Performed by: FAMILY MEDICINE

## 2024-12-06 PROCEDURE — 3077F SYST BP >= 140 MM HG: CPT | Performed by: FAMILY MEDICINE

## 2024-12-06 PROCEDURE — 3080F DIAST BP >= 90 MM HG: CPT | Performed by: FAMILY MEDICINE

## 2024-12-06 RX ORDER — LEVOTHYROXINE SODIUM 75 UG/1
75 TABLET ORAL
COMMUNITY

## 2024-12-06 RX ORDER — FLUOXETINE 10 MG/1
CAPSULE ORAL
Qty: 42 CAPSULE | Refills: 0 | Status: SHIPPED | OUTPATIENT
Start: 2024-12-06 | End: 2025-01-03

## 2024-12-06 ASSESSMENT — ANXIETY QUESTIONNAIRES
1. FEELING NERVOUS, ANXIOUS, OR ON EDGE: NEARLY EVERY DAY
5. BEING SO RESTLESS THAT IT IS HARD TO SIT STILL: NEARLY EVERY DAY
6. BECOMING EASILY ANNOYED OR IRRITABLE: NEARLY EVERY DAY
2. NOT BEING ABLE TO STOP OR CONTROL WORRYING: NEARLY EVERY DAY
GAD7 TOTAL SCORE: 21
7. FEELING AFRAID AS IF SOMETHING AWFUL MIGHT HAPPEN: NEARLY EVERY DAY
3. WORRYING TOO MUCH ABOUT DIFFERENT THINGS: NEARLY EVERY DAY
IF YOU CHECKED OFF ANY PROBLEMS ON THIS QUESTIONNAIRE, HOW DIFFICULT HAVE THESE PROBLEMS MADE IT FOR YOU TO DO YOUR WORK, TAKE CARE OF THINGS AT HOME, OR GET ALONG WITH OTHER PEOPLE: VERY DIFFICULT
4. TROUBLE RELAXING: NEARLY EVERY DAY

## 2024-12-06 ASSESSMENT — FIBROSIS 4 INDEX: FIB4 SCORE: 1.16

## 2024-12-07 NOTE — PROGRESS NOTES
Verbal consent was acquired by the patient to use UserVoice ambient listening note generation during this visit:  Yes      Chief complaint::Diagnoses of NICK (generalized anxiety disorder) and Dyslipidemia were pertinent to this visit.    Assessment and Plan:   The following treatment plan was discussed:     Assessment & Plan  1. Generalized anxiety disorder - Chronic, unstable condition with high anxiety levels likely triggered by significant life stressors.  - Start Prozac 10 mg daily for 2 weeks  - If insufficient, increase to 20 mg  - Referral to psychology services initiated    2. Dyslipidemia - Chronic, stable condition with possible genetic and menopausal influences on lipid profile.  - Continue dietary modifications  - Maintain high physical activity    Follow-up  - Follow up in 4 weeks to assess effectiveness of medication and overall progress  Aixa was seen today for fatigue, anxiety and blood pressure problem.    Diagnoses and all orders for this visit:    NICK (generalized anxiety disorder)  -     Referral to Psychology  -     FLUoxetine (PROZAC) 10 MG Cap; Take 1 Capsule by mouth every day for 14 days, THEN 2 Capsules every day for 14 days.    Dyslipidemia        Followup: Return in about 4 weeks (around 1/3/2025).    Subjective/HPI:   HPI:    Aixa Yuen is a pleasant 48 y.o. female here for   Chief Complaint   Patient presents with    Fatigue    Anxiety    Blood Pressure Problem        History of Present Illness  The patient is a 48-year-old individual with rapid heart rate, chronic fatigue, and anxiety.    Symptoms attributed to anxiety, elevated for 1.5 years, coinciding with their mother's Alzheimer's diagnosis and their divorce. 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 Suspects stressors contributing to elevated blood pressure. Generally healthy but struggling with these  "issues. Utilizing virtual psychology services but considering in-person sessions. Open to medication. Previously prescribed sertraline, which caused excessive daytime sleepiness. Tried Prozac in the past without adverse effects.    NICK-7 Questionnaire    Feeling nervous, anxious, or on edge: Nearly every day  Not being able to sop or control worrying: Nearly every day  Worrying too much about different things: Nearly every day  Trouble relaxing: Nearly every day  Being so restless that it's hard to sit still: Nearly every day  Becoming easily annoyed or irritable: Nearly every day  Feeling afraid as if something awful might happen: Nearly every day  Total: 21    Interpretation of NICK 7 Total Score   Score Severity :  0-4 No Anxiety   5-9 Mild Anxiety  10-14 Moderate Anxiety  15-21 Severe Anxiety      Postmenopausal and reports feeling \"off.\"    Strong family history of high cholesterol.    Supplemental Information  On Spiriva, omega-3 fatty acids, meloxicam 15, magnesium, Synthroid 75, estradiol cream, B12, vitamin D3 K2, B complex with B12.    SOCIAL HISTORY  They work as a physical therapist in primary care.    FAMILY HISTORY  Their mother was diagnosed with Alzheimer's. They have a strong family history of high cholesterol.    MEDICATIONS  Spiriva, omega-3 fatty acids, meloxicam 15, magnesium, Synthroid 75, estradiol cream, B12, vitamin D3 K2, B complex with B12.  Discontinued: Diflucan, sertraline.    Current Medicines (including changes today)  Current Outpatient Medications   Medication Sig Dispense Refill    levothyroxine (SYNTHROID) 75 MCG Tab Take 75 mcg by mouth every morning on an empty stomach. On synthroid name brand      VITAMIN D PO Take  by mouth.      B Complex Vitamins (VITAMIN B COMPLEX PO) Take  by mouth.      FLUoxetine (PROZAC) 10 MG Cap Take 1 Capsule by mouth every day for 14 days, THEN 2 Capsules every day for 14 days. 42 Capsule 0    albuterol 108 (90 Base) MCG/ACT Aero Soln inhalation " "aerosol Inhale 1 puff as needed every 4 hrs for 30 days 8.5 g 5    Tiotropium Bromide Monohydrate (SPIRIVA RESPIMAT) 1.25 MCG/ACT Aero Soln Take 2 puffs by Inhalation Once a day 90 days 12 g 1    estradiol (ESTRACE) 0.1 MG/GM vaginal cream Insert 1 gm into the vagina at once daily at bedtime for 2 weeks then twice a week as maintenance. 42.5 g 3    meloxicam (MOBIC) 15 MG tablet Take 1 Tablet by mouth every day as needed for joint pain. 30 Tablet 2    MAGNESIUM PO Magnesium      Omega-3 Fatty Acids (FISH OIL PO) Take  by mouth.       No current facility-administered medications for this visit.     Past Medical/ Surgical History  She  has a past medical history of Alcohol use, Chronic back pain, and Left knee pain.  She  has a past surgical history that includes arthroscopy, knee (01/01/2013); appendectomy laparoscopic (05/18/2015); and thyroid lobectomy (Right, 2020).       Objective:   BP (!) 152/98   Pulse 80   Temp 36.3 °C (97.3 °F)   Ht 1.626 m (5' 4\")   Wt 60.3 kg (133 lb)   SpO2 100%  Body mass index is 22.83 kg/m².    Physical Exam  Constitutional:       General: She is not in acute distress.     Appearance: She is not ill-appearing or toxic-appearing.   HENT:      Head: Normocephalic.      Right Ear: Tympanic membrane and external ear normal.      Left Ear: Tympanic membrane and external ear normal.      Nose: Nose normal. No rhinorrhea.      Mouth/Throat:      Mouth: Mucous membranes are moist.      Pharynx: Oropharynx is clear. No posterior oropharyngeal erythema.   Eyes:      General:         Right eye: No discharge.         Left eye: No discharge.      Conjunctiva/sclera: Conjunctivae normal.      Pupils: Pupils are equal, round, and reactive to light.   Cardiovascular:      Rate and Rhythm: Normal rate and regular rhythm.      Heart sounds: No murmur heard.  Pulmonary:      Effort: Pulmonary effort is normal. No respiratory distress.      Breath sounds: Normal breath sounds. No wheezing. "   Abdominal:      General: Abdomen is flat.   Musculoskeletal:         General: No swelling or tenderness.      Cervical back: Normal range of motion and neck supple.      Right lower leg: No edema.      Left lower leg: No edema.   Skin:     General: Skin is warm.   Neurological:      General: No focal deficit present.      Mental Status: She is alert and oriented to person, place, and time. Mental status is at baseline.   Psychiatric:         Mood and Affect: Mood is anxious.          Lab/ Imaging Results:  Results  - Laboratory Studies:    - Electrolytes: normal    - Blood sugar: 81    - BUN: normal    - Creatinine: normal    - GFR: normal    - Kidney function: normal    - Calcium levels: normal    - Liver enzymes and function: normal    - LDL cholesterol: elevated    - HDL: normal    - Triglycerides: normal    - TSH: normal    - T4: normal    - T3: normal    - A1c: 5.4%    Please note that this dictation was created using voice recognition software. I have made every reasonable attempt to correct obvious errors, but I expect that there are errors of grammar and possibly content that I did not discover before finalizing the note.

## 2025-01-03 ENCOUNTER — OFFICE VISIT (OUTPATIENT)
Dept: MEDICAL GROUP | Facility: MEDICAL CENTER | Age: 50
End: 2025-01-03
Payer: COMMERCIAL

## 2025-01-03 VITALS
SYSTOLIC BLOOD PRESSURE: 154 MMHG | BODY MASS INDEX: 22.53 KG/M2 | HEART RATE: 80 BPM | TEMPERATURE: 97.3 F | WEIGHT: 132 LBS | HEIGHT: 64 IN | OXYGEN SATURATION: 99 % | DIASTOLIC BLOOD PRESSURE: 90 MMHG

## 2025-01-03 DIAGNOSIS — I10 PRIMARY HYPERTENSION: ICD-10-CM

## 2025-01-03 DIAGNOSIS — F41.1 GAD (GENERALIZED ANXIETY DISORDER): ICD-10-CM

## 2025-01-03 DIAGNOSIS — R06.02 SOB (SHORTNESS OF BREATH) ON EXERTION: ICD-10-CM

## 2025-01-03 PROCEDURE — 3080F DIAST BP >= 90 MM HG: CPT | Performed by: FAMILY MEDICINE

## 2025-01-03 PROCEDURE — 3077F SYST BP >= 140 MM HG: CPT | Performed by: FAMILY MEDICINE

## 2025-01-03 PROCEDURE — 99214 OFFICE O/P EST MOD 30 MIN: CPT | Performed by: FAMILY MEDICINE

## 2025-01-03 RX ORDER — AMLODIPINE BESYLATE 5 MG/1
5 TABLET ORAL EVERY EVENING
Qty: 90 TABLET | Refills: 3 | Status: SHIPPED | OUTPATIENT
Start: 2025-01-03

## 2025-01-03 ASSESSMENT — FIBROSIS 4 INDEX: FIB4 SCORE: 1.18

## 2025-01-03 NOTE — PROGRESS NOTES
Verbal consent was acquired by the patient to use [employer] ambient listening note generation during this visit:  Yes      Chief complaint::Diagnoses of NICK (generalized anxiety disorder), Primary hypertension, and SOB (shortness of breath) on exertion were pertinent to this visit.    Assessment and Plan:   The following treatment plan was discussed:     Assessment & Plan  1. Generalized anxiety disorder - Chronic stable condition with significant improvement. Prefers mindfulness techniques over Prozac.  - Advised to continue psychology services  - Stop Prozac  - Monitor symptoms and discuss restarting Prozac if anxiety worsens    2. Primary hypertension - Chronic condition with resumed amlodipine 5 mg every evening. Blood pressure normalizing.  - Prescription refill sent  - Start with 2.5 mg for 2 weeks, then increase to 5 mg if blood pressure remains elevated    3. Weight loss -acute.  Lost 4-5 pounds due to healthier diet and reduced alcohol consumption.  - Advised to monitor weight and ensure it does not drop excessively  - Discuss further evaluation and potential lab tests if weight continues to decrease    4. Shortness of breath on exertion - New diagnosis with reports of shortness of breath with elevation.  - Ordered echocardiogram to rule out cardiac issues  - Advised to continue cardiology follow-up    Follow-up  - Follow up in 3 months to assess anxiety and establish care  Aixa was seen today for follow-up and anxiety.    Diagnoses and all orders for this visit:    NICK (generalized anxiety disorder)    Primary hypertension  -     amLODIPine (NORVASC) 5 MG Tab; Take 1 Tablet by mouth every evening.    SOB (shortness of breath) on exertion  -     EC-ECHOCARDIOGRAM COMPLETE W/O CONT; Future        Followup: Return in about 3 months (around 4/3/2025) for Establish care and follow-up on anxiety and blood pressure.    Subjective/HPI:     HPI:    Aixa Yuen is a pleasant 49 y.o. female here for    Chief Complaint   Patient presents with    Follow-Up    Anxiety        History of Present Illness  The patient is a 55-year-old individual here for follow-up on anxiety, hypertension, weight loss, and shortness of breath.    They report improved anxiety symptoms due to a mindfulness-based stress reduction program and a meditation group. They have been taking Prozac without significant changes and will start counseling therapy next week.    They resumed amlodipine, initially prescribed at a low dose, and their blood pressure has normalized. They are on 5 mg at night, starting with half a tablet for 2 weeks before increasing to a full tablet. They inquire about increasing the dosage if their blood pressure remains above 120/80 and whether their thyroid condition could be contributing. Both their parents had blood pressure issues, but their sisters do not.    They have lost 4-5 pounds due to a healthier diet and reduced alcohol consumption. They maintain adequate hydration but find it challenging while working.    They have a cardiologist appointment next week for a history of pericardial effusion, suspected to be viral. They report shortness of breath during activities involving elevation, which is atypical for them.    SOCIAL HISTORY  They are not drinking alcohol as much.    FAMILY HISTORY  Their parents both have had blood pressure issues.    MEDICATIONS  Current: Amlodipine, Prozac    Current Medicines (including changes today)  Current Outpatient Medications   Medication Sig Dispense Refill    amLODIPine (NORVASC) 5 MG Tab Take 1 Tablet by mouth every evening. 90 Tablet 3    levothyroxine (SYNTHROID) 75 MCG Tab Take 75 mcg by mouth every morning on an empty stomach. On synthroid name brand      VITAMIN D PO Take  by mouth.      albuterol 108 (90 Base) MCG/ACT Aero Soln inhalation aerosol Inhale 1 puff as needed every 4 hrs for 30 days 8.5 g 5    Tiotropium Bromide Monohydrate (SPIRIVA RESPIMAT) 1.25 MCG/ACT  "Aero Soln Take 2 puffs by Inhalation Once a day 90 days 12 g 1    estradiol (ESTRACE) 0.1 MG/GM vaginal cream Insert 1 gm into the vagina at once daily at bedtime for 2 weeks then twice a week as maintenance. 42.5 g 3    meloxicam (MOBIC) 15 MG tablet Take 1 Tablet by mouth every day as needed for joint pain. 30 Tablet 2    MAGNESIUM PO Magnesium      Omega-3 Fatty Acids (FISH OIL PO) Take  by mouth.       No current facility-administered medications for this visit.     Past Medical/ Surgical History  She  has a past medical history of Alcohol use, Chronic back pain, and Left knee pain.  She  has a past surgical history that includes arthroscopy, knee (01/01/2013); appendectomy laparoscopic (05/18/2015); and thyroid lobectomy (Right, 2020).       Objective:   BP (!) 154/90   Pulse 80   Temp 36.3 °C (97.3 °F)   Ht 1.626 m (5' 4\")   Wt 59.9 kg (132 lb)   SpO2 99%  Body mass index is 22.66 kg/m².    Physical exam was made by observation   Physical Exam  Constitutional:       General: She is not in acute distress.  HENT:      Head: Normocephalic and atraumatic.   Eyes:      Conjunctiva/sclera: Conjunctivae normal.      Pupils: Pupils are equal, round, and reactive to light.   Pulmonary:      Effort: Pulmonary effort is normal. No respiratory distress.   Abdominal:      General: There is no distension.   Musculoskeletal:      Cervical back: Normal range of motion and neck supple.   Skin:     General: Skin is warm and dry.      Findings: No rash.   Neurological:      Mental Status: She is alert and oriented to person, place, and time.      Gait: Gait is intact.   Psychiatric:         Mood and Affect: Affect normal.          Lab/ Imaging Results:  No labs or imaging to review    Please note that this dictation was created using voice recognition software. I have made every reasonable attempt to correct obvious errors, but I expect that there are errors of grammar and possibly content that I did not discover before " finalizing the note.

## 2025-01-14 ENCOUNTER — APPOINTMENT (OUTPATIENT)
Dept: MEDICAL GROUP | Facility: LAB | Age: 50
End: 2025-01-14
Payer: COMMERCIAL

## 2025-02-09 ENCOUNTER — PATIENT MESSAGE (OUTPATIENT)
Dept: MEDICAL GROUP | Facility: MEDICAL CENTER | Age: 50
End: 2025-02-09
Payer: COMMERCIAL

## 2025-02-11 PROCEDURE — RXMED WILLOW AMBULATORY MEDICATION CHARGE: Performed by: FAMILY MEDICINE

## 2025-02-11 RX ORDER — LEVOTHYROXINE SODIUM 75 UG/1
75 TABLET ORAL
Qty: 90 TABLET | Refills: 3 | Status: SHIPPED | OUTPATIENT
Start: 2025-02-11

## 2025-02-14 PROCEDURE — RXMED WILLOW AMBULATORY MEDICATION CHARGE: Performed by: STUDENT IN AN ORGANIZED HEALTH CARE EDUCATION/TRAINING PROGRAM

## 2025-02-14 PROCEDURE — RXMED WILLOW AMBULATORY MEDICATION CHARGE: Performed by: FAMILY MEDICINE

## 2025-02-20 ENCOUNTER — PHARMACY VISIT (OUTPATIENT)
Dept: PHARMACY | Facility: MEDICAL CENTER | Age: 50
End: 2025-02-20
Payer: MEDICARE

## 2025-03-06 ENCOUNTER — APPOINTMENT (OUTPATIENT)
Dept: LAB | Facility: MEDICAL CENTER | Age: 50
End: 2025-03-06
Payer: COMMERCIAL

## 2025-03-10 ENCOUNTER — PATIENT MESSAGE (OUTPATIENT)
Dept: MEDICAL GROUP | Facility: MEDICAL CENTER | Age: 50
End: 2025-03-10
Payer: COMMERCIAL

## 2025-03-10 RX ORDER — TIOTROPIUM BROMIDE INHALATION SPRAY 1.56 UG/1
SPRAY, METERED RESPIRATORY (INHALATION)
Qty: 12 G | Refills: 1 | Status: SHIPPED | OUTPATIENT
Start: 2025-03-10

## 2025-03-12 PROCEDURE — RXMED WILLOW AMBULATORY MEDICATION CHARGE: Performed by: STUDENT IN AN ORGANIZED HEALTH CARE EDUCATION/TRAINING PROGRAM

## 2025-03-14 ENCOUNTER — PHARMACY VISIT (OUTPATIENT)
Dept: PHARMACY | Facility: MEDICAL CENTER | Age: 50
End: 2025-03-14
Payer: MEDICARE

## 2025-03-14 PROCEDURE — RXMED WILLOW AMBULATORY MEDICATION CHARGE: Performed by: FAMILY MEDICINE

## 2025-03-26 ENCOUNTER — APPOINTMENT (OUTPATIENT)
Dept: LAB | Facility: MEDICAL CENTER | Age: 50
End: 2025-03-26
Payer: COMMERCIAL

## 2025-04-25 ENCOUNTER — OFFICE VISIT (OUTPATIENT)
Dept: MEDICAL GROUP | Facility: MEDICAL CENTER | Age: 50
End: 2025-04-25
Payer: COMMERCIAL

## 2025-04-25 VITALS
OXYGEN SATURATION: 100 % | WEIGHT: 135 LBS | BODY MASS INDEX: 23.05 KG/M2 | HEART RATE: 60 BPM | HEIGHT: 64 IN | SYSTOLIC BLOOD PRESSURE: 144 MMHG | TEMPERATURE: 97.2 F | DIASTOLIC BLOOD PRESSURE: 78 MMHG

## 2025-04-25 DIAGNOSIS — H61.21 IMPACTED CERUMEN OF RIGHT EAR: ICD-10-CM

## 2025-04-25 DIAGNOSIS — M25.562 CHRONIC PAIN OF LEFT KNEE: ICD-10-CM

## 2025-04-25 DIAGNOSIS — I10 PRIMARY HYPERTENSION: ICD-10-CM

## 2025-04-25 DIAGNOSIS — M54.9 CHRONIC BACK PAIN, UNSPECIFIED BACK LOCATION, UNSPECIFIED BACK PAIN LATERALITY: ICD-10-CM

## 2025-04-25 DIAGNOSIS — R01.1 MURMUR: ICD-10-CM

## 2025-04-25 DIAGNOSIS — R06.02 SOB (SHORTNESS OF BREATH) ON EXERTION: ICD-10-CM

## 2025-04-25 DIAGNOSIS — N89.8 VAGINAL DRYNESS: ICD-10-CM

## 2025-04-25 DIAGNOSIS — G89.29 CHRONIC PAIN OF LEFT KNEE: ICD-10-CM

## 2025-04-25 DIAGNOSIS — G89.29 CHRONIC BACK PAIN, UNSPECIFIED BACK LOCATION, UNSPECIFIED BACK PAIN LATERALITY: ICD-10-CM

## 2025-04-25 DIAGNOSIS — E03.9 ACQUIRED HYPOTHYROIDISM: ICD-10-CM

## 2025-04-25 DIAGNOSIS — J45.20 MILD INTERMITTENT ASTHMA WITHOUT STATUS ASTHMATICUS WITHOUT COMPLICATION: ICD-10-CM

## 2025-04-25 PROBLEM — E07.9 THYROID DISORDER: Status: ACTIVE | Noted: 2025-04-25

## 2025-04-25 PROBLEM — I31.39 PERICARDIAL EFFUSION: Status: ACTIVE | Noted: 2023-08-17

## 2025-04-25 PROBLEM — Z85.850 HISTORY OF MALIGNANT NEOPLASM OF THYROID: Status: RESOLVED | Noted: 2025-04-25 | Resolved: 2025-04-25

## 2025-04-25 PROCEDURE — 3077F SYST BP >= 140 MM HG: CPT | Performed by: FAMILY MEDICINE

## 2025-04-25 PROCEDURE — 3078F DIAST BP <80 MM HG: CPT | Performed by: FAMILY MEDICINE

## 2025-04-25 PROCEDURE — RXMED WILLOW AMBULATORY MEDICATION CHARGE: Performed by: FAMILY MEDICINE

## 2025-04-25 PROCEDURE — 99214 OFFICE O/P EST MOD 30 MIN: CPT | Performed by: FAMILY MEDICINE

## 2025-04-25 RX ORDER — AMLODIPINE BESYLATE 5 MG/1
5 TABLET ORAL EVERY EVENING
Qty: 90 TABLET | Refills: 3 | Status: SHIPPED | OUTPATIENT
Start: 2025-04-25

## 2025-04-25 RX ORDER — MELOXICAM 15 MG/1
TABLET ORAL
Qty: 30 TABLET | Refills: 2 | Status: SHIPPED | OUTPATIENT
Start: 2025-04-25

## 2025-04-25 RX ORDER — ALBUTEROL SULFATE 90 UG/1
INHALANT RESPIRATORY (INHALATION)
Qty: 8.5 G | Refills: 11 | Status: SHIPPED | OUTPATIENT
Start: 2025-04-25

## 2025-04-25 RX ORDER — ESTRADIOL 0.1 MG/G
CREAM VAGINAL
Qty: 42.5 G | Refills: 3 | Status: SHIPPED | OUTPATIENT
Start: 2025-04-25

## 2025-04-25 RX ORDER — LEVOTHYROXINE SODIUM 75 UG/1
75 TABLET ORAL
Qty: 90 TABLET | Refills: 3 | Status: SHIPPED | OUTPATIENT
Start: 2025-04-25

## 2025-04-25 RX ORDER — TIOTROPIUM BROMIDE INHALATION SPRAY 1.56 UG/1
SPRAY, METERED RESPIRATORY (INHALATION)
Qty: 12 G | Refills: 11 | Status: SHIPPED | OUTPATIENT
Start: 2025-04-25

## 2025-04-25 RX ORDER — MONTELUKAST SODIUM 10 MG/1
10 TABLET ORAL DAILY
Qty: 90 TABLET | Refills: 3 | Status: SHIPPED | OUTPATIENT
Start: 2025-04-25

## 2025-04-25 ASSESSMENT — FIBROSIS 4 INDEX: FIB4 SCORE: 1.18

## 2025-04-25 NOTE — LETTER
[facility], later [facility]  [address]  [address]  [place] [address]  Fax: [phone]   Authorization for Release/Disclosure of   Protected Health Information   Name: AIXA HWANG : 1975 SSN: [id number]   Address: [address] [place] [address] [place] [place] [address] Phone:    There are no phone numbers on file.   I authorize the entity listed below to release/disclose the PHI below to:   [facility] and [facility]   Provider or Entity Name:  Dr. Ji   Address   City, State, [facility]   Phone:      Fax:     Reason for request: continuity of care   Information to be released:    [  ] LAST COLONOSCOPY,  including any PATH REPORT and follow-up  [  ] LAST FIT/COLOGUARD RESULT [  ] LAST DEXA  [  ] LAST MAMMOGRAM  [  ] LAST PAP  [  ] LAST LABS [  ] RETINA EXAM REPORT  [  ] IMMUNIZATION RECORDS  [ X ] Release all info      [  ] Check here and initial the line next to each item to release ALL health information INCLUDING  _____ Care and treatment for drug and / or alcohol abuse  _____ HIV testing, infection status, or AIDS  _____ Genetic Testing    DATES OF SERVICE OR TIME PERIOD TO BE DISCLOSED: _____________  I understand and acknowledge that:  * This Authorization may be revoked at any time by you in writing, except if your health information has already been used or disclosed.  * Your health information that will be used or disclosed as a result of you signing this authorization could be re-disclosed by the recipient. If this occurs, your re-disclosed health information may no longer be protected by State or Federal laws.  * You may refuse to sign this Authorization. Your refusal will not affect your ability to obtain treatment.  * This Authorization becomes effective upon signing and will  on (date) __________.      If no date is indicated, this Authorization will  one (1) year from the signature date.    Name: Aixa Antunez  Cro[place]  Signature: Date:   4/25/2025     PLEASE FAX REQUESTED RECORDS BACK TO: [phone]

## 2025-04-25 NOTE — PROGRESS NOTES
Verbal consent was acquired by the patient to use light ambient listening note generation during this visit:  Yes      Chief complaint::Diagnoses of SOB (shortness of breath) on exertion, Mild intermittent asthma without status asthmaticus without complication, Primary hypertension, Acquired hypothyroidism, Chronic pain of left knee, Chronic back pain, unspecified back location, unspecified back pain laterality, Vaginal dryness, Murmur, and Impacted cerumen of right ear were pertinent to this visit.    Assessment and Plan:   The following treatment plan was discussed:     Assessment & Plan  1. Shortness of breath on exertion: Chronic, unstable.  - Trial of montelukast 10 mg discussed, including risks (agitation, aggression, depression, sleep disturbances, suicidal thoughts/behaviors).  - Prescription sent to preferred pharmacy.  - Referrals to pulmonary medicine and asthma specialist sent.  - Stop medication if serious neuropsychiatric events occur.    2. Mild intermittent asthma: Chronic, unstable.  - Refills for albuterol and Spiriva sent to preferred pharmacy.  - Continue rescue inhaler as needed, monitor symptoms.  - Follow-up with asthma specialist if symptoms persist/worsen.    3. Primary hypertension: Chronic, stable.  - Continue amlodipine 5 mg daily.  - BP controlled: 112/70, 124/80.  - Maintain readings under 130/80 to avoid increasing medication.    4. Acquired hypothyroidism: Chronic, stable.  - Thyroid ultrasound negative for nodules.  - Thyroid studies: Free T3 2.62, TSH 2.11, Free thyroxine 1.52.  - Continue current medication.    5. Chronic left knee pain: Chronic, stable.  - Refill of meloxicam 15 mg daily sent to preferred pharmacy.  - Continue current pain management.    6. Chronic back pain: Chronic, stable.  - Refill of meloxicam 15 mg daily sent to preferred pharmacy.  - Continue current pain management.    7. Vaginal dryness: Chronic, stable.  - Refill of estradiol intravaginal cream  sent to preferred pharmacy.  - Continue using cream as directed.    8. Cardiac murmur: Chronic, stable.  - Follow-up with Dr. Itzel Palomino in cardiology.    9. Impacted cerumen in right ear: New condition.  - Ear lavage to remove impacted cerumen.    Follow-up  - Follow-up in May.  Aixa was seen today for referral needed, asthma and breathing problem.    Diagnoses and all orders for this visit:    SOB (shortness of breath) on exertion  -     Referral to Pulmonary and Sleep Medicine  -     Referral to Allergy  -     montelukast (SINGULAIR) 10 MG Tab; Take 1 Tablet by mouth every day.    Mild intermittent asthma without status asthmaticus without complication  -     Referral to Pulmonary and Sleep Medicine  -     Referral to Allergy  -     montelukast (SINGULAIR) 10 MG Tab; Take 1 Tablet by mouth every day.  -     albuterol 108 (90 Base) MCG/ACT Aero Soln inhalation aerosol; Inhale 1 puff by mouth as needed every 4 hours  -     Tiotropium Bromide Monohydrate (SPIRIVA RESPIMAT) 1.25 MCG/ACT Aero Soln; Inhale 2 puffs by mouth Once a day    Primary hypertension  -     amLODIPine (NORVASC) 5 MG Tab; Take 1 Tablet by mouth every evening.    Acquired hypothyroidism  -     levothyroxine (SYNTHROID) 75 MCG Tab; Take 1 Tablet by mouth every morning on an empty stomach. On synthroid name brand    Chronic pain of left knee  -     meloxicam (MOBIC) 15 MG tablet; Take 1 tablet by mouth Once a day As needed for joint pain    Chronic back pain, unspecified back location, unspecified back pain laterality  -     meloxicam (MOBIC) 15 MG tablet; Take 1 tablet by mouth Once a day As needed for joint pain    Vaginal dryness  -     estradiol (ESTRACE) 0.1 MG/GM vaginal cream; Insert 1 gram into the vagina at once daily at bedtime for 2 weeks then twice a week as maintenance.    Murmur    Impacted cerumen of right ear  -     Ear Irrigation (MA Only); Future        Followup: Return in about 1 year (around 4/25/2026) for Annual.    I have  placed ear irrigation orders.  The MA is preforming ear irrigation orders under the direction of Dr. Snyder    Subjective/HPI:   HPI:    Aixa Yuen is a pleasant 49 y.o. female here for   Chief Complaint   Patient presents with    Referral Needed    Asthma    Breathing Problem        History of Present Illness  The patient is a 49-year-old individual presenting for follow-up on shortness of breath.    Cardiac Function  - Cardiologist reported normal cardiac function except for minor pericardial effusion, suspected to influence blood pressure.  - BP readings: 112/70 last weekend, 124/80 yesterday, 119/94 before medication yesterday.  - PA advised no need to increase amlodipine if BP remains below 130/80.  - Currently on amlodipine.    Chest Tightness  - Chest tightness improves with albuterol inhaler, used during mountain bike ride.  - Developed sensation of needing to cough with deep breath at rest this year.  - Unable to schedule pulmonologist appointment.  - Worsening allergies reported; Singulair not tried.  - Describes burning sensation in lungs and history of reflux.    Clogged Ears  - Reports clogged ears affecting hearing.    Heart Murmur  - History of heart murmur, not detected during last evaluation.  - Underwent echocardiogram and exercise stress test.    Medication Refills  - Seeking refills for levothyroxine, Spiriva, amlodipine, albuterol, estrogen, meloxicam, Singulair, and amiodarone.  - Increased use of albuterol inhaler recently.    Supplemental information: None.    Current Medicines (including changes today)  Current Outpatient Medications   Medication Sig Dispense Refill    montelukast (SINGULAIR) 10 MG Tab Take 1 Tablet by mouth every day. 90 Tablet 3    levothyroxine (SYNTHROID) 75 MCG Tab Take 1 Tablet by mouth every morning on an empty stomach. On synthroid name brand 90 Tablet 3    albuterol 108 (90 Base) MCG/ACT Aero Soln inhalation aerosol Inhale 1 puff by mouth as needed  "every 4 hours 8.5 g 11    amLODIPine (NORVASC) 5 MG Tab Take 1 Tablet by mouth every evening. 90 Tablet 3    estradiol (ESTRACE) 0.1 MG/GM vaginal cream Insert 1 gram into the vagina at once daily at bedtime for 2 weeks then twice a week as maintenance. 42.5 g 3    meloxicam (MOBIC) 15 MG tablet Take 1 tablet by mouth Once a day As needed for joint pain 30 Tablet 2    Tiotropium Bromide Monohydrate (SPIRIVA RESPIMAT) 1.25 MCG/ACT Aero Soln Inhale 2 puffs by mouth Once a day 12 g 11    VITAMIN D PO Take  by mouth.      MAGNESIUM PO Magnesium      Omega-3 Fatty Acids (FISH OIL PO) Take  by mouth.       No current facility-administered medications for this visit.     Past Medical/ Surgical History  She  has a past medical history of Alcohol use, Chronic back pain, Disease of thyroid gland, History of malignant neoplasm of thyroid (04/25/2025), and Left knee pain.  She  has a past surgical history that includes arthroscopy, knee (01/01/2013); appendectomy laparoscopic (05/18/2015); thyroid lobectomy (Right, 2020); and orif, knee (2012).       Objective:   BP (!) 144/78   Pulse 60   Temp 36.2 °C (97.2 °F)   Ht 1.626 m (5' 4\")   Wt 61.2 kg (135 lb)   SpO2 100%  Body mass index is 23.17 kg/m².    Physical Exam  Constitutional:       General: She is not in acute distress.     Appearance: She is not ill-appearing or toxic-appearing.   HENT:      Head: Normocephalic.      Right Ear: There is impacted cerumen.      Left Ear: Tympanic membrane and external ear normal.      Nose: Nose normal. No rhinorrhea.      Mouth/Throat:      Mouth: Mucous membranes are moist.      Pharynx: Oropharynx is clear. No posterior oropharyngeal erythema.   Eyes:      General:         Right eye: No discharge.         Left eye: No discharge.      Conjunctiva/sclera: Conjunctivae normal.      Pupils: Pupils are equal, round, and reactive to light.   Cardiovascular:      Rate and Rhythm: Normal rate and regular rhythm.      Heart sounds: No " murmur heard.  Pulmonary:      Effort: Pulmonary effort is normal. No respiratory distress.      Breath sounds: Normal breath sounds. No wheezing.   Abdominal:      General: Abdomen is flat.   Musculoskeletal:         General: No swelling or tenderness.      Cervical back: Normal range of motion and neck supple.      Right lower leg: No edema.      Left lower leg: No edema.   Skin:     General: Skin is warm.   Neurological:      General: No focal deficit present.      Mental Status: She is alert and oriented to person, place, and time. Mental status is at baseline.   Psychiatric:         Mood and Affect: Mood normal.          Lab/ Imaging Results:  Results  - Labs:    - Free T3: 2.62    - TSH: 2.11    - Free Thyroxine: 1.52    - Imaging:    - Thyroid ultrasound: Negative for thyroid nodules    Please note that this dictation was created using voice recognition software. I have made every reasonable attempt to correct obvious errors, but I expect that there are errors of grammar and possibly content that I did not discover before finalizing the note.

## 2025-05-02 NOTE — Clinical Note
REFERRAL APPROVAL NOTICE         Sent on May 2, 2025                   Aixa Yuen  [address] [place] [address]                   Dear MsQuang Wilfrid,    After a careful review of the medical information and benefit coverage, [facility] has processed your referral. See below for additional details.    If applicable, you must be actively enrolled with your insurance for coverage of the authorized service. If you have any questions regarding your coverage, please contact your insurance directly.    REFERRAL INFORMATION   Referral #:  [id number]  Referred-To Department    Referred-By Provider:  Pulmonary and Sleep Medicine    YUE Curtis   [place] [facility]      [address]  [address]  [place] [id number] [phone] [address], [address]  [place] [address] [phone]    Referral Start Date:  04/25/2025  Referral End Date:   04/25/2026           SCHEDULING  If you do not already have an appointment, please call [phone] to make an appointment.   MORE INFORMATION  As a reminder, [facility] ownership has changed, meaning this location is now owned and operated by [facility]. As such, we want to clarify that our patients should expect to receive two separate bills for the services received at [facility] - one representing the [facility] facility fees as the owner of the establishment, and the other to represent the physician's services and subsequent fees. You can speak with your insurance carrier for a pricing estimate by calling the customer service number on the back of your card and ask about charges for a hospital outpatient visit.  If you do not already have a eGood account, sign up at: [email or web address]  You can access your medical information, make appointments, see lab results, billing  information, and more.  If you have questions regarding this referral, please contact  the [facility] department at:             [phone]. Monday - Friday 7:30AM - 5:00PM.      Sincerely,  [facility]

## 2025-05-02 NOTE — Clinical Note
REFERRAL APPROVAL NOTICE         Sent on May 2, 2025                   Aixa Antunez Wilfrid  [address] [place] [address]                   Dear MsQuang Wilfrid,    After a careful review of the medical information and benefit coverage, [facility] has processed your referral. See below for additional details.    If applicable, you must be actively enrolled with your insurance for coverage of the authorized service. If you have any questions regarding your coverage, please contact your insurance directly.    REFERRAL INFORMATION   Referral #:  [id number]  Referred-To Provider    Referred-By Provider:  Allergy and Immunology    YUE Curtis   [facility]      [address]  [address]  [place] [address]  [phone] [address]  [place] [address]  [phone]    Referral Start Date:  04/25/2025  Referral End Date:   04/25/2026             SCHEDULING  If you do not already have an appointment, please call [phone] to make an appointment.     MORE INFORMATION  If you do not already have a [employer] account, sign up at: [email or web address]  You can access your medical information, make appointments, see lab results, billing information, and more.  If you have questions regarding this referral, please contact  the [facility] Referrals department at:             [phone]. Monday - Friday 8:00AM - 5:00PM.     Sincerely,    [facility]

## 2025-05-08 ENCOUNTER — PHARMACY VISIT (OUTPATIENT)
Dept: PHARMACY | Facility: MEDICAL CENTER | Age: 50
End: 2025-05-08
Payer: MEDICARE

## 2025-05-08 ENCOUNTER — HOSPITAL ENCOUNTER (OUTPATIENT)
Dept: LAB | Facility: MEDICAL CENTER | Age: 50
End: 2025-05-08
Attending: PHYSICIAN ASSISTANT
Payer: COMMERCIAL

## 2025-05-08 LAB
ALBUMIN SERPL BCP-MCNC: 4.8 G/DL (ref 3.2–4.9)
ALBUMIN/GLOB SERPL: 1.7 G/DL
ALP SERPL-CCNC: 45 U/L (ref 30–99)
ALT SERPL-CCNC: 15 U/L (ref 2–50)
ANION GAP SERPL CALC-SCNC: 10 MMOL/L (ref 7–16)
AST SERPL-CCNC: 20 U/L (ref 12–45)
BILIRUB SERPL-MCNC: 0.4 MG/DL (ref 0.1–1.5)
BUN SERPL-MCNC: 14 MG/DL (ref 8–22)
CALCIUM ALBUM COR SERPL-MCNC: 9.1 MG/DL (ref 8.5–10.5)
CALCIUM SERPL-MCNC: 9.7 MG/DL (ref 8.5–10.5)
CHLORIDE SERPL-SCNC: 104 MMOL/L (ref 96–112)
CO2 SERPL-SCNC: 26 MMOL/L (ref 20–33)
CREAT SERPL-MCNC: 0.89 MG/DL (ref 0.5–1.4)
GFR SERPLBLD CREATININE-BSD FMLA CKD-EPI: 78 ML/MIN/1.73 M 2
GLOBULIN SER CALC-MCNC: 2.8 G/DL (ref 1.9–3.5)
GLUCOSE SERPL-MCNC: 91 MG/DL (ref 65–99)
PHOSPHATE SERPL-MCNC: 3.3 MG/DL (ref 2.5–4.5)
POTASSIUM SERPL-SCNC: 4.4 MMOL/L (ref 3.6–5.5)
PROT SERPL-MCNC: 7.6 G/DL (ref 6–8.2)
PTH-INTACT SERPL-MCNC: 82.7 PG/ML (ref 14–72)
SODIUM SERPL-SCNC: 140 MMOL/L (ref 135–145)
T4 FREE SERPL-MCNC: 1.33 NG/DL (ref 0.93–1.7)
TSH SERPL-ACNC: 1.56 UIU/ML (ref 0.38–5.33)

## 2025-05-08 PROCEDURE — 80053 COMPREHEN METABOLIC PANEL: CPT

## 2025-05-08 PROCEDURE — 86376 MICROSOMAL ANTIBODY EACH: CPT

## 2025-05-08 PROCEDURE — 36415 COLL VENOUS BLD VENIPUNCTURE: CPT

## 2025-05-08 PROCEDURE — 84439 ASSAY OF FREE THYROXINE: CPT

## 2025-05-08 PROCEDURE — 84100 ASSAY OF PHOSPHORUS: CPT

## 2025-05-08 PROCEDURE — 83970 ASSAY OF PARATHORMONE: CPT

## 2025-05-08 PROCEDURE — 84443 ASSAY THYROID STIM HORMONE: CPT

## 2025-05-08 PROCEDURE — 86800 THYROGLOBULIN ANTIBODY: CPT

## 2025-05-10 LAB
THYROGLOB AB SERPL-ACNC: <1.5 IU/ML (ref 0–4)
THYROPEROXIDASE AB SERPL-ACNC: 0.4 IU/ML (ref 0–9)

## 2025-05-21 ENCOUNTER — PATIENT MESSAGE (OUTPATIENT)
Dept: MEDICAL GROUP | Facility: MEDICAL CENTER | Age: 50
End: 2025-05-21
Payer: COMMERCIAL

## 2025-05-21 DIAGNOSIS — R06.02 SOB (SHORTNESS OF BREATH) ON EXERTION: Primary | ICD-10-CM

## 2025-05-22 ENCOUNTER — HOSPITAL ENCOUNTER (OUTPATIENT)
Facility: MEDICAL CENTER | Age: 50
End: 2025-05-22
Attending: INTERNAL MEDICINE
Payer: COMMERCIAL

## 2025-05-22 LAB — 25(OH)D3 SERPL-MCNC: 29 NG/ML (ref 30–100)

## 2025-05-22 PROCEDURE — 36415 COLL VENOUS BLD VENIPUNCTURE: CPT

## 2025-05-22 PROCEDURE — 82306 VITAMIN D 25 HYDROXY: CPT

## 2025-05-22 PROCEDURE — 84432 ASSAY OF THYROGLOBULIN: CPT

## 2025-05-22 PROCEDURE — 86800 THYROGLOBULIN ANTIBODY: CPT

## 2025-05-24 LAB
THYROGLOB AB SERPL-ACNC: <1.5 IU/ML (ref 0–4)
THYROGLOB SERPL-MCNC: 4.3 NG/ML (ref 1.3–31.8)
THYROGLOB SERPL-MCNC: NORMAL NG/ML (ref 1.3–31.8)

## 2025-05-28 PROCEDURE — RXMED WILLOW AMBULATORY MEDICATION CHARGE: Performed by: FAMILY MEDICINE

## 2025-06-03 ENCOUNTER — PATIENT MESSAGE (OUTPATIENT)
Dept: MEDICAL GROUP | Facility: MEDICAL CENTER | Age: 50
End: 2025-06-03
Payer: COMMERCIAL

## 2025-06-03 DIAGNOSIS — J45.909 UNCOMPLICATED ASTHMA, UNSPECIFIED ASTHMA SEVERITY, UNSPECIFIED WHETHER PERSISTENT: ICD-10-CM

## 2025-06-03 DIAGNOSIS — I10 PRIMARY HYPERTENSION: ICD-10-CM

## 2025-06-03 DIAGNOSIS — R06.02 SOB (SHORTNESS OF BREATH) ON EXERTION: Primary | ICD-10-CM

## 2025-06-03 DIAGNOSIS — E78.5 DYSLIPIDEMIA: ICD-10-CM

## 2025-06-05 NOTE — Clinical Note
REFERRAL APPROVAL NOTICE         Sent on June 5, 2025                   Aixa Harmony Kyrgyz  [address] [place] [address]                   Dear Ms. Kyrgyz,    After a careful review of the medical information and benefit coverage, [facility] has processed your referral. See below for additional details.    If applicable, you must be actively enrolled with your insurance for coverage of the authorized service. If you have any questions regarding your coverage, please contact your insurance directly.    REFERRAL INFORMATION   Referral #:  [id number]  Referred-To Department    Referred-By Provider:  Pulmonary and Sleep Medicine    YUE Curtis   Pulmonary Rehab [facility]      [address]  [address]  [place] [address]  [phone] [address]  [place] [address]  [phone]    Referral Start Date:  06/03/2025  Referral End Date:   06/03/2026             SCHEDULING  If you do not already have an appointment, please call [phone] to make an appointment.     MORE INFORMATION  If you do not already have a [employer] account, sign up at: [email or web address]  You can access your medical information, make appointments, see lab results, billing information, and more.  If you have questions regarding this referral, please contact  the [facility] Referrals department at:             [phone]. Monday - Friday 8:00AM - 5:00PM.     Sincerely,    [facility]

## 2025-06-13 ENCOUNTER — PHARMACY VISIT (OUTPATIENT)
Dept: PHARMACY | Facility: MEDICAL CENTER | Age: 50
End: 2025-06-13
Payer: MEDICARE

## 2025-06-17 ENCOUNTER — APPOINTMENT (OUTPATIENT)
Dept: URGENT CARE | Facility: CLINIC | Age: 50
End: 2025-06-17
Payer: COMMERCIAL

## 2025-06-17 ENCOUNTER — PHARMACY VISIT (OUTPATIENT)
Dept: PHARMACY | Facility: MEDICAL CENTER | Age: 50
End: 2025-06-17
Payer: MEDICARE

## 2025-06-17 ENCOUNTER — APPOINTMENT (OUTPATIENT)
Dept: RADIOLOGY | Facility: IMAGING CENTER | Age: 50
End: 2025-06-17
Payer: COMMERCIAL

## 2025-06-17 ENCOUNTER — OFFICE VISIT (OUTPATIENT)
Dept: URGENT CARE | Facility: CLINIC | Age: 50
End: 2025-06-17
Payer: COMMERCIAL

## 2025-06-17 VITALS
HEART RATE: 89 BPM | HEIGHT: 64 IN | SYSTOLIC BLOOD PRESSURE: 100 MMHG | TEMPERATURE: 98.4 F | OXYGEN SATURATION: 96 % | DIASTOLIC BLOOD PRESSURE: 72 MMHG | WEIGHT: 134 LBS | RESPIRATION RATE: 16 BRPM | BODY MASS INDEX: 22.88 KG/M2

## 2025-06-17 DIAGNOSIS — U07.1 COVID-19: ICD-10-CM

## 2025-06-17 DIAGNOSIS — U07.1 COVID-19: Primary | ICD-10-CM

## 2025-06-17 PROCEDURE — RXMED WILLOW AMBULATORY MEDICATION CHARGE

## 2025-06-17 PROCEDURE — 99214 OFFICE O/P EST MOD 30 MIN: CPT

## 2025-06-17 PROCEDURE — 3074F SYST BP LT 130 MM HG: CPT

## 2025-06-17 PROCEDURE — 3078F DIAST BP <80 MM HG: CPT

## 2025-06-17 PROCEDURE — 71046 X-RAY EXAM CHEST 2 VIEWS: CPT | Mod: TC,FY | Performed by: RADIOLOGY

## 2025-06-17 RX ORDER — DEXTROMETHORPHAN HYDROBROMIDE AND PROMETHAZINE HYDROCHLORIDE 15; 6.25 MG/5ML; MG/5ML
5 SYRUP ORAL EVERY 6 HOURS PRN
Qty: 118 ML | Refills: 0 | Status: SHIPPED | OUTPATIENT
Start: 2025-06-17 | End: 2025-06-24

## 2025-06-17 RX ORDER — BENZONATATE 100 MG/1
100 CAPSULE ORAL 3 TIMES DAILY PRN
Qty: 60 CAPSULE | Refills: 0 | Status: SHIPPED | OUTPATIENT
Start: 2025-06-17

## 2025-06-17 ASSESSMENT — FIBROSIS 4 INDEX: FIB4 SCORE: 1.27

## 2025-06-17 ASSESSMENT — ENCOUNTER SYMPTOMS
COUGH: 1
SPUTUM PRODUCTION: 0
FEVER: 0
SORE THROAT: 1

## 2025-06-17 NOTE — LETTER
June 17, 2025    To Whom It May Concern:         This is confirmation that Aixa Antunez Wilfrid attended her appointment with Lisette Huertas P.A.-C. on 6/17/25. Please excuse her absence from work tomorrow.          If you have any questions please do not hesitate to call me at the phone number listed below.    Sincerely,          Liestte Huertas P.A.-C.  [phone]

## 2025-06-17 NOTE — PROGRESS NOTES
Subjective:     CHIEF COMPLAINT  Chief Complaint   Patient presents with    Fever     X 2 days    Coronavirus Screening     Patient explains did take a home covid test and it was Positive yesterday        HPI  Aixa Yuen is a very pleasant 49 y.o. female who presents with a persistent dry cough, low-grade fevers, a sore throat, congestion, a reduced appetite, and fatigue.  Her symptoms started 6 days ago on Thursday.  She tested positive for COVID at home yesterday.  She reports that her cough worsens greatly with lying flat, which has caused her great difficulty with sleeping.  She has tried taking Robitussin and Mucinex with some mild improvement in symptoms.  Her most recent measured fever was yesterday.  She reports a history of asthma and has been using her inhalers as prescribed.    REVIEW OF SYSTEMS  Review of Systems   Constitutional:  Positive for malaise/fatigue. Negative for fever (Resolved).   HENT:  Positive for congestion and sore throat.    Respiratory:  Positive for cough. Negative for sputum production.        PAST MEDICAL HISTORY  Patient Active Problem List    Diagnosis Date Noted    Thyroid disorder 04/25/2025    Primary hypertension 01/03/2025    SOB (shortness of breath) on exertion 01/03/2025    NICK (generalized anxiety disorder) 12/06/2024    Dyslipidemia 12/06/2024    Pericardial effusion 08/17/2023    Asthma 06/29/2022    Headache 11/26/2018    Hip pain, right 06/04/2018    Knee pain, left 06/04/2018    Abdominal pain, right upper quadrant 07/03/2017    Gastroesophageal reflux disease 07/03/2017    Chronic back pain 03/11/2015    Chronic pain of left knee 03/11/2015    Murmur 01/26/2012       SURGICAL HISTORY   has a past surgical history that includes arthroscopy, knee (01/01/2013); appendectomy laparoscopic (05/18/2015); thyroid lobectomy (Right, 2020); and orif, knee (2012).    ALLERGIES  Allergies[1]    CURRENT MEDICATIONS  Home Medications       Reviewed by Lisette CHANG  "KEIRY Huertas (Physician Assistant) on 06/17/25 at 1119  Med List Status: <None>     Medication Last Dose Status   albuterol 108 (90 Base) MCG/ACT Aero Soln inhalation aerosol Taking Active   amLODIPine (NORVASC) 5 MG Tab Taking Active   estradiol (ESTRACE) 0.1 MG/GM vaginal cream Taking Active   estradiol (ESTRACE) 0.1 MG/GM vaginal cream Taking Active   levothyroxine (SYNTHROID) 75 MCG Tab Taking Active   MAGNESIUM PO Taking Active   meloxicam (MOBIC) 15 MG tablet Taking Active   montelukast (SINGULAIR) 10 MG Tab Taking Active   Omega-3 Fatty Acids (FISH OIL PO) Taking Active   Tiotropium Bromide Monohydrate (SPIRIVA RESPIMAT) 1.25 MCG/ACT Aero Soln Taking Active   VITAMIN D PO Taking Active                    SOCIAL HISTORY  Social History     Tobacco Use    Smoking status: Never    Smokeless tobacco: Never   Vaping Use    Vaping status: Never Used   Substance and Sexual Activity    Alcohol use: Yes     Alcohol/week: 2.4 oz     Types: 4 Glasses of wine per week     Comment: few drinks a week    Drug use: No    Sexual activity: Not on file       FAMILY HISTORY  Family History   Problem Relation Age of Onset    Stroke Father     Alcohol/Drug Father         alcoholic    Stroke Paternal Grandmother     Alcohol/Drug Mother         alcoholic    Hypertension Mother     Cancer Paternal Aunt         melanoma    Cancer Paternal Aunt         lung ca, smoker    Thyroid Paternal Aunt     Allergies Neg Hx     Heart Disease Neg Hx     Diabetes Neg Hx     Hyperlipidemia Neg Hx     Psychiatric Illness Neg Hx           Objective:     VITAL SIGNS: /72 (BP Location: Left arm, Patient Position: Sitting, BP Cuff Size: Large adult)   Pulse 89   Temp 36.9 °C (98.4 °F)   Resp 16   Ht 1.626 m (5' 4\")   Wt 60.8 kg (134 lb)   SpO2 96%   BMI 23.00 kg/m²     PHYSICAL EXAM  Physical Exam  Vitals reviewed.   Constitutional:       General: She is not in acute distress.     Appearance: Normal appearance. She is ill-appearing. She " is not toxic-appearing.   HENT:      Head: Normocephalic and atraumatic.      Right Ear: Tympanic membrane, ear canal and external ear normal.      Left Ear: Tympanic membrane, ear canal and external ear normal.      Nose: Congestion present.      Mouth/Throat:      Mouth: Mucous membranes are moist.      Pharynx: Posterior oropharyngeal erythema (Mild) present. No oropharyngeal exudate.   Eyes:      Conjunctiva/sclera: Conjunctivae normal.      Pupils: Pupils are equal, round, and reactive to light.   Cardiovascular:      Rate and Rhythm: Normal rate and regular rhythm.      Heart sounds: Normal heart sounds.   Pulmonary:      Effort: Pulmonary effort is normal. No respiratory distress.      Breath sounds: Normal breath sounds. No stridor. No wheezing, rhonchi or rales.   Skin:     General: Skin is warm and dry.      Coloration: Skin is not pale.      Findings: No rash.   Neurological:      General: No focal deficit present.      Mental Status: She is alert and oriented to person, place, and time.   Psychiatric:         Mood and Affect: Mood normal.         Assessment/Plan:     1. COVID-19  - DX-CHEST-2 VIEWS; Future  - benzonatate (TESSALON) 100 MG Cap; Take 1 Capsule by mouth 3 times a day as needed for Cough.  Dispense: 60 Capsule; Refill: 0  - promethazine-dextromethorphan (PROMETHAZINE-DM) 6.25-15 MG/5ML syrup; Take 5 mL by mouth every 6 hours as needed for Cough for up to 7 days. (caution: may cause sedation)  Dispense: 118 mL; Refill: 0  - Continue use of inhalers as prescribed  - Rest and hydrate  - Tylenol OTC as needed for fever/discomfort  - Monitor symptoms at home and return to clinic if symptoms worsen or fail to resolve    Results:  DX-CHEST-2 VIEWS  Narrative: 6/17/2025 11:28 AM    HISTORY/REASON FOR EXAM:  Cough, congestion for 6 days    TECHNIQUE/EXAM DESCRIPTION AND NUMBER OF VIEWS:  Two views of the chest.    COMPARISON:  5/21/2023.    FINDINGS:    LUNGS: Clear. No effusions.  PNEUMOTHORAX:  None.  LINES AND TUBES: None.  MEDIASTINUM: No cardiomegaly.  MUSCULOSKELETAL STRUCTURES: No acute displaced fracture.  Impression: No acute cardiopulmonary abnormality.        MDM/Comments:  I have prepared for this visit by personally reviewing the patient's prevous medical records, vitals, and labs including: most recent GFR of 78. Patient has stable vital signs and is non-toxic appearing.  She has tested positive for COVID at home and has been sick for approximately 6 days.  Given prolonged duration of fevers, chest x-ray has been ordered to rule out a secondary bacterial infection.  Chest x-ray shows no acute cardiopulmonary abnormality or evidence of pneumonia.  I personally reviewed x-ray images and agree with radiology's interpretation.  Patient has been provided benzonatate for daytime cough relief and Promethazine DM for nighttime cough relief.  Discussed continued use of inhalers.  Discussed supportive care with hydration, rest, Tylenol as needed. Patient demonstrated understanding of treatment plan at this time and will RTC if symptoms worsen or fail to resolve.       Differential diagnosis, natural history, supportive care, and indications for immediate follow-up discussed. All questions answered. Patient agrees with the plan of care.    Follow-up as needed if symptoms worsen or fail to improve to PCP, Urgent care or Emergency Room.    I have personally reviewed prior external notes and test results pertinent to today's visit.  I have independently reviewed and interpreted all diagnostics ordered during this urgent care acute visit.   Discussed management options (risks,benefits, and alternatives to treatment). Pt expresses understanding and the treatment plan was agreed upon. Questions were encouraged and answered to pt's satisfaction.    Please note that this dictation was created using voice recognition software. I have made a reasonable attempt to correct obvious errors, but I expect that there are  errors of grammar and possibly content that I did not discover before finalizing the note.         [1]   Allergies  Allergen Reactions    Nitrofurantoin Cough and Unspecified     Other reaction(s): dry cough    persistent cough    Other Reaction(s): Not available    Aleve [Naproxen Sodium] Diarrhea    Percocet [Oxycodone-Acetaminophen] Itching    Pseudoephedrine Unspecified     Bad headache/ wired    Sulfamethoxazole W-Trimethoprim      Other Reaction(s): Muscle Aches, Not available

## 2025-06-20 ENCOUNTER — APPOINTMENT (OUTPATIENT)
Dept: RADIOLOGY | Facility: MEDICAL CENTER | Age: 50
End: 2025-06-20
Attending: FAMILY MEDICINE
Payer: COMMERCIAL

## 2025-06-23 ENCOUNTER — OFFICE VISIT (OUTPATIENT)
Dept: URGENT CARE | Facility: CLINIC | Age: 50
End: 2025-06-23
Payer: COMMERCIAL

## 2025-06-23 VITALS
WEIGHT: 138.2 LBS | TEMPERATURE: 97.9 F | HEIGHT: 64 IN | OXYGEN SATURATION: 100 % | HEART RATE: 62 BPM | SYSTOLIC BLOOD PRESSURE: 114 MMHG | RESPIRATION RATE: 12 BRPM | DIASTOLIC BLOOD PRESSURE: 88 MMHG | BODY MASS INDEX: 23.6 KG/M2

## 2025-06-23 DIAGNOSIS — H00.012 HORDEOLUM EXTERNUM OF RIGHT LOWER EYELID: Primary | ICD-10-CM

## 2025-06-23 PROCEDURE — 3074F SYST BP LT 130 MM HG: CPT | Performed by: NURSE PRACTITIONER

## 2025-06-23 PROCEDURE — 3079F DIAST BP 80-89 MM HG: CPT | Performed by: NURSE PRACTITIONER

## 2025-06-23 PROCEDURE — RXMED WILLOW AMBULATORY MEDICATION CHARGE: Performed by: NURSE PRACTITIONER

## 2025-06-23 PROCEDURE — 99213 OFFICE O/P EST LOW 20 MIN: CPT | Performed by: NURSE PRACTITIONER

## 2025-06-23 RX ORDER — ERYTHROMYCIN 5 MG/G
1 OINTMENT OPHTHALMIC 3 TIMES DAILY
Qty: 3.5 G | Refills: 0 | Status: SHIPPED | OUTPATIENT
Start: 2025-06-23 | End: 2025-07-24

## 2025-06-23 ASSESSMENT — FIBROSIS 4 INDEX: FIB4 SCORE: 1.27

## 2025-06-23 ASSESSMENT — VISUAL ACUITY: OU: 1

## 2025-06-24 ENCOUNTER — PHARMACY VISIT (OUTPATIENT)
Dept: PHARMACY | Facility: MEDICAL CENTER | Age: 50
End: 2025-06-24
Payer: MEDICARE

## 2025-06-24 NOTE — PROGRESS NOTES
"Aixa Yuen is a 49 y.o. female who presents for Eye Pain (R eye, swelling, redness, x1 week)      HPI  This is a new problem. Aixa Yuen is a 49 y.o. patient who presents to urgent care with c/o: Right eye pain and swelling and redness for a week.  She has been doing warm compresses and occasional cold compresses.  She has a history of stye's.  She washes her eyes with a tea tree special eye cleaning medication that helps reduce her styes.  The stye is not improving.  Her vision is reported as normal.  She does not wear contacts or glasses.  No other aggravating leaving factors.    ROS See HPI    Allergies:     Allergies[1]    PMSFS Hx:  Past Medical History[2]  Past Surgical History[3]  Family History   Problem Relation Age of Onset    Stroke Father     Alcohol/Drug Father         alcoholic    Stroke Paternal Grandmother     Alcohol/Drug Mother         alcoholic    Hypertension Mother     Cancer Paternal Aunt         melanoma    Cancer Paternal Aunt         lung ca, smoker    Thyroid Paternal Aunt     Allergies Neg Hx     Heart Disease Neg Hx     Diabetes Neg Hx     Hyperlipidemia Neg Hx     Psychiatric Illness Neg Hx      Social History     Tobacco Use    Smoking status: Never    Smokeless tobacco: Never   Substance Use Topics    Alcohol use: Yes     Alcohol/week: 2.4 oz     Types: 4 Glasses of wine per week     Comment: few drinks a week         Problems:   Problem List[4]    Medications:   Medications Ordered Prior to Encounter[5]     Objective:     /88 (BP Location: Left arm, Patient Position: Sitting, BP Cuff Size: Adult)   Pulse 62   Temp 36.6 °C (97.9 °F) (Temporal)   Resp 12   Ht 1.626 m (5' 4\")   Wt 62.7 kg (138 lb 3.2 oz)   SpO2 100%   BMI 23.72 kg/m²     Physical Exam  Vitals reviewed.   Constitutional:       Appearance: Normal appearance.   Eyes:      General: Vision grossly intact.         Right eye: Discharge and hordeolum (x2) present.      Extraocular " Movements: Extraocular movements intact.      Conjunctiva/sclera:      Right eye: Right conjunctiva is injected.      Left eye: Left conjunctiva is not injected.     Neurological:      Mental Status: She is alert.         Assessment /Associated Orders:      1. Hordeolum externum of right lower eyelid  erythromycin 5 MG/GM Ointment          Medical Decision Making:    Aixa Yune is a very pleasant 49 y.o. female who is clinically stable at today's acute urgent care visit. Presents with acute problem/ concern today.    No acute distress is noted at the time of the visit.  VSS. Appropriate for outpatient care at this time.     Educated in proper administration of  prescription medication(s) ordered today including safety, possible SE, risks, benefits, rationale and alternatives to therapy.   Educated in proper administration of  prescription medication(s) ordered today including safety, possible SE, risks, benefits, rationale and alternatives to therapy.   Use dilute baby shampoo solution to gently clean eyelashes and eyelid margin(s) daily for 2-3 days.   Warm compresses 3 or 4 times a day/ prn   Educated in infection control practices.     Follow Up:   Return to urgent care prn if new or worsening sx or if there is no improvement in condition prn.    Educated in Red flags and indications to immediately call 911 or present to the Emergency Department.           Please note that this dictation was created using voice recognition software. I have worked with consultants from the vendor as well as technical experts from [facility] [employer] to optimize the interface. I have made every reasonable attempt to correct obvious errors, but I expect that there are errors of grammar and possibly content that I did not discover before finalizing the note.  This note was electronically signed by provider           [1]   Allergies  Allergen Reactions    Nitrofurantoin Cough and Unspecified     Other reaction(s): dry  cough    persistent cough    Other Reaction(s): Not available    Aleve [Naproxen Sodium] Diarrhea    Percocet [Oxycodone-Acetaminophen] Itching    Pseudoephedrine Unspecified     Bad headache/ wired    Sulfamethoxazole W-Trimethoprim      Other Reaction(s): Muscle Aches, Not available   [2]   Past Medical History:  Diagnosis Date    Alcohol use     Chronic back pain     Disease of thyroid gland     History of malignant neoplasm of thyroid 04/25/2025    Left knee pain    [3]   Past Surgical History:  Procedure Laterality Date    THYROID LOBECTOMY Right 2020    APPENDECTOMY LAPAROSCOPIC  05/18/2015    Procedure: APPENDECTOMY LAPAROSCOPIC;  Surgeon: Yared Braswell M.D.;  Location: SURGERY [facility];  Service:     ARTHROSCOPY, KNEE  01/01/2013    ORIF, KNEE  2012   [4]   Patient Active Problem List  Diagnosis    Murmur    Chronic back pain    Chronic pain of left knee    Abdominal pain, right upper quadrant    Asthma    Gastroesophageal reflux disease    Headache    Hip pain, right    Knee pain, left    NICK (generalized anxiety disorder)    Dyslipidemia    Primary hypertension    SOB (shortness of breath) on exertion    Thyroid disorder    Pericardial effusion   [5]   Current Outpatient Medications on File Prior to Visit   Medication Sig Dispense Refill    benzonatate (TESSALON) 100 MG Cap Take 1 Capsule by mouth 3 times a day as needed for Cough. 60 Capsule 0    promethazine-dextromethorphan (PROMETHAZINE-DM) 6.25-15 MG/5ML syrup Take 5 mL by mouth every 6 hours as needed for Cough for up to 7 days. (caution: may cause sedation) 118 mL 0    estradiol (ESTRACE) 0.1 MG/GM vaginal cream Insert 1 gram vaginally at bedtime once daily  for 2 weeks then use twice  a week as maintenance. 42.5 g 0    montelukast (SINGULAIR) 10 MG Tab Take 1 Tablet by mouth every day. 90 Tablet 3    levothyroxine (SYNTHROID) 75 MCG Tab Take 1 Tablet by mouth every morning on an empty stomach. On synthroid name brand 90 Tablet 3     albuterol 108 (90 Base) MCG/ACT Aero Soln inhalation aerosol Inhale 1 puff by mouth as needed every 4 hours 8.5 g 11    amLODIPine (NORVASC) 5 MG Tab Take 1 Tablet by mouth every evening. 90 Tablet 3    estradiol (ESTRACE) 0.1 MG/GM vaginal cream Insert 1 gram into the vagina at once daily at bedtime for 2 weeks then twice a week as maintenance. 42.5 g 3    meloxicam (MOBIC) 15 MG tablet Take 1 tablet by mouth Once a day As needed for joint pain 30 Tablet 2    Tiotropium Bromide Monohydrate (SPIRIVA RESPIMAT) 1.25 MCG/ACT Aero Soln Inhale 2 puffs by mouth Once a day 12 g 11    VITAMIN D PO Take  by mouth.      MAGNESIUM PO Magnesium      Omega-3 Fatty Acids (FISH OIL PO) Take  by mouth.       No current facility-administered medications on file prior to visit.

## 2025-07-22 ENCOUNTER — APPOINTMENT (OUTPATIENT)
Facility: MEDICAL CENTER | Age: 50
End: 2025-07-22
Attending: FAMILY MEDICINE
Payer: COMMERCIAL

## 2025-08-21 ENCOUNTER — PHARMACY VISIT (OUTPATIENT)
Dept: PHARMACY | Facility: MEDICAL CENTER | Age: 50
End: 2025-08-21
Payer: MEDICARE

## 2025-08-21 PROCEDURE — RXMED WILLOW AMBULATORY MEDICATION CHARGE: Performed by: FAMILY MEDICINE

## 2025-08-29 ENCOUNTER — HOSPITAL ENCOUNTER (OUTPATIENT)
Dept: RADIOLOGY | Facility: MEDICAL CENTER | Age: 50
End: 2025-08-29
Attending: FAMILY MEDICINE
Payer: COMMERCIAL

## 2025-08-29 ENCOUNTER — HOSPITAL ENCOUNTER (OUTPATIENT)
Dept: LAB | Facility: MEDICAL CENTER | Age: 50
End: 2025-08-29
Attending: INTERNAL MEDICINE
Payer: COMMERCIAL

## 2025-08-29 DIAGNOSIS — E78.5 DYSLIPIDEMIA: ICD-10-CM

## 2025-08-29 DIAGNOSIS — R06.02 SOB (SHORTNESS OF BREATH) ON EXERTION: ICD-10-CM

## 2025-08-29 DIAGNOSIS — I10 PRIMARY HYPERTENSION: ICD-10-CM

## 2025-08-29 LAB
25(OH)D3 SERPL-MCNC: 40 NG/ML (ref 30–100)
ALBUMIN SERPL BCP-MCNC: 4.8 G/DL (ref 3.2–4.9)
ALBUMIN/GLOB SERPL: 1.9 G/DL
ALP SERPL-CCNC: 41 U/L (ref 30–99)
ALT SERPL-CCNC: 20 U/L (ref 2–50)
ANION GAP SERPL CALC-SCNC: 12 MMOL/L (ref 7–16)
AST SERPL-CCNC: 23 U/L (ref 12–45)
BILIRUB SERPL-MCNC: 0.5 MG/DL (ref 0.1–1.5)
BUN SERPL-MCNC: 16 MG/DL (ref 8–22)
CALCIUM ALBUM COR SERPL-MCNC: 9.1 MG/DL (ref 8.5–10.5)
CALCIUM SERPL-MCNC: 9.7 MG/DL (ref 8.5–10.5)
CHLORIDE SERPL-SCNC: 105 MMOL/L (ref 96–112)
CO2 SERPL-SCNC: 25 MMOL/L (ref 20–33)
CREAT SERPL-MCNC: 0.8 MG/DL (ref 0.5–1.4)
GFR SERPLBLD CREATININE-BSD FMLA CKD-EPI: 89 ML/MIN/1.73 M 2
GLOBULIN SER CALC-MCNC: 2.5 G/DL (ref 1.9–3.5)
GLUCOSE SERPL-MCNC: 91 MG/DL (ref 65–99)
PHOSPHATE SERPL-MCNC: 3.2 MG/DL (ref 2.5–4.5)
POTASSIUM SERPL-SCNC: 4.2 MMOL/L (ref 3.6–5.5)
PROT SERPL-MCNC: 7.3 G/DL (ref 6–8.2)
PTH-INTACT SERPL-MCNC: 35.6 PG/ML (ref 14–72)
SODIUM SERPL-SCNC: 142 MMOL/L (ref 135–145)

## 2025-08-29 PROCEDURE — 80053 COMPREHEN METABOLIC PANEL: CPT

## 2025-08-29 PROCEDURE — 82306 VITAMIN D 25 HYDROXY: CPT

## 2025-08-29 PROCEDURE — 83970 ASSAY OF PARATHORMONE: CPT

## 2025-08-29 PROCEDURE — 36415 COLL VENOUS BLD VENIPUNCTURE: CPT

## 2025-08-29 PROCEDURE — 4410556 CT-CARDIAC SCORING (SELF PAY ONLY)

## 2025-08-29 PROCEDURE — 84100 ASSAY OF PHOSPHORUS: CPT
